# Patient Record
Sex: FEMALE | Race: WHITE | NOT HISPANIC OR LATINO | Employment: FULL TIME | URBAN - METROPOLITAN AREA
[De-identification: names, ages, dates, MRNs, and addresses within clinical notes are randomized per-mention and may not be internally consistent; named-entity substitution may affect disease eponyms.]

---

## 2017-02-07 ENCOUNTER — APPOINTMENT (OUTPATIENT)
Dept: GENERAL RADIOLOGY | Facility: CLINIC | Age: 42
DRG: 871 | End: 2017-02-07
Attending: EMERGENCY MEDICINE
Payer: COMMERCIAL

## 2017-02-07 ENCOUNTER — HOSPITAL ENCOUNTER (INPATIENT)
Facility: CLINIC | Age: 42
LOS: 2 days | Discharge: HOME | DRG: 871 | End: 2017-02-10
Attending: EMERGENCY MEDICINE | Admitting: FAMILY MEDICINE
Payer: COMMERCIAL

## 2017-02-07 DIAGNOSIS — I95.9 HYPOTENSION, UNSPECIFIED HYPOTENSION TYPE: ICD-10-CM

## 2017-02-07 DIAGNOSIS — J18.9 PNEUMONIA OF LEFT LOWER LOBE DUE TO INFECTIOUS ORGANISM: ICD-10-CM

## 2017-02-07 DIAGNOSIS — J10.1 INFLUENZA A: ICD-10-CM

## 2017-02-07 LAB
ALBUMIN SERPL-MCNC: 3.7 G/DL (ref 3.4–5)
ALBUMIN UR-MCNC: 30 MG/DL
ALP SERPL-CCNC: 54 U/L (ref 40–150)
ALT SERPL W P-5'-P-CCNC: 33 U/L (ref 0–50)
ANION GAP SERPL CALCULATED.3IONS-SCNC: 10 MMOL/L (ref 3–14)
APPEARANCE UR: CLEAR
AST SERPL W P-5'-P-CCNC: 32 U/L (ref 0–45)
BACTERIA #/AREA URNS HPF: ABNORMAL /HPF
BASOPHILS # BLD AUTO: 0 10E9/L (ref 0–0.2)
BASOPHILS NFR BLD AUTO: 0 %
BILIRUB SERPL-MCNC: 0.6 MG/DL (ref 0.2–1.3)
BILIRUB UR QL STRIP: NEGATIVE
BUN SERPL-MCNC: 12 MG/DL (ref 7–30)
CALCIUM SERPL-MCNC: 8.3 MG/DL (ref 8.5–10.1)
CHLORIDE SERPL-SCNC: 101 MMOL/L (ref 94–109)
CO2 SERPL-SCNC: 25 MMOL/L (ref 20–32)
COLOR UR AUTO: YELLOW
CREAT SERPL-MCNC: 0.85 MG/DL (ref 0.52–1.04)
DEPRECATED S PYO AG THROAT QL EIA: NORMAL
DIFFERENTIAL METHOD BLD: ABNORMAL
EOSINOPHIL # BLD AUTO: 0 10E9/L (ref 0–0.7)
EOSINOPHIL NFR BLD AUTO: 0 %
ERYTHROCYTE [DISTWIDTH] IN BLOOD BY AUTOMATED COUNT: 12.4 % (ref 10–15)
FLUAV+FLUBV AG SPEC QL: ABNORMAL
FLUAV+FLUBV AG SPEC QL: ABNORMAL
GFR SERPL CREATININE-BSD FRML MDRD: 73 ML/MIN/1.7M2
GLUCOSE SERPL-MCNC: 107 MG/DL (ref 70–99)
GLUCOSE UR STRIP-MCNC: NEGATIVE MG/DL
HCT VFR BLD AUTO: 41.1 % (ref 35–47)
HGB BLD-MCNC: 13.5 G/DL (ref 11.7–15.7)
HGB UR QL STRIP: ABNORMAL
IMM GRANULOCYTES # BLD: 0 10E9/L (ref 0–0.4)
IMM GRANULOCYTES NFR BLD: 0.4 %
KETONES UR STRIP-MCNC: 80 MG/DL
LACTATE BLD-SCNC: 0.7 MMOL/L (ref 0.7–2.1)
LACTATE BLD-SCNC: 0.9 MMOL/L (ref 0.7–2.1)
LEUKOCYTE ESTERASE UR QL STRIP: NEGATIVE
LIPASE SERPL-CCNC: 183 U/L (ref 73–393)
LYMPHOCYTES # BLD AUTO: 0.2 10E9/L (ref 0.8–5.3)
LYMPHOCYTES NFR BLD AUTO: 1.6 %
MCH RBC QN AUTO: 29.6 PG (ref 26.5–33)
MCHC RBC AUTO-ENTMCNC: 32.8 G/DL (ref 31.5–36.5)
MCV RBC AUTO: 90 FL (ref 78–100)
MICRO REPORT STATUS: NORMAL
MONOCYTES # BLD AUTO: 0.7 10E9/L (ref 0–1.3)
MONOCYTES NFR BLD AUTO: 6.6 %
MUCOUS THREADS #/AREA URNS LPF: PRESENT /LPF
NEUTROPHILS # BLD AUTO: 10 10E9/L (ref 1.6–8.3)
NEUTROPHILS NFR BLD AUTO: 91.4 %
NITRATE UR QL: NEGATIVE
PH UR STRIP: 6 PH (ref 5–7)
PLATELET # BLD AUTO: 171 10E9/L (ref 150–450)
POTASSIUM SERPL-SCNC: 3.5 MMOL/L (ref 3.4–5.3)
PROT SERPL-MCNC: 7.6 G/DL (ref 6.8–8.8)
RBC # BLD AUTO: 4.56 10E12/L (ref 3.8–5.2)
RBC #/AREA URNS AUTO: 4 /HPF (ref 0–2)
SODIUM SERPL-SCNC: 136 MMOL/L (ref 133–144)
SP GR UR STRIP: 1.02 (ref 1–1.03)
SPECIMEN SOURCE: ABNORMAL
SPECIMEN SOURCE: NORMAL
SQUAMOUS #/AREA URNS AUTO: 2 /HPF (ref 0–1)
URN SPEC COLLECT METH UR: ABNORMAL
UROBILINOGEN UR STRIP-MCNC: NORMAL MG/DL (ref 0–2)
WBC # BLD AUTO: 10.9 10E9/L (ref 4–11)
WBC #/AREA URNS AUTO: 1 /HPF (ref 0–2)

## 2017-02-07 PROCEDURE — 99207 ZZC CDG-CHARGE/DX NOT SELECTED BY PROVIDER: CPT | Performed by: FAMILY MEDICINE

## 2017-02-07 PROCEDURE — G0378 HOSPITAL OBSERVATION PER HR: HCPCS

## 2017-02-07 PROCEDURE — 87804 INFLUENZA ASSAY W/OPTIC: CPT | Performed by: EMERGENCY MEDICINE

## 2017-02-07 PROCEDURE — 25000128 H RX IP 250 OP 636: Performed by: EMERGENCY MEDICINE

## 2017-02-07 PROCEDURE — 25000128 H RX IP 250 OP 636: Performed by: FAMILY MEDICINE

## 2017-02-07 PROCEDURE — 96366 THER/PROPH/DIAG IV INF ADDON: CPT

## 2017-02-07 PROCEDURE — 96375 TX/PRO/DX INJ NEW DRUG ADDON: CPT

## 2017-02-07 PROCEDURE — 99218 ZZC INITIAL OBSERVATION CARE,LEVL I: CPT | Performed by: FAMILY MEDICINE

## 2017-02-07 PROCEDURE — 85025 COMPLETE CBC W/AUTO DIFF WBC: CPT | Performed by: FAMILY MEDICINE

## 2017-02-07 PROCEDURE — 96367 TX/PROPH/DG ADDL SEQ IV INF: CPT

## 2017-02-07 PROCEDURE — 87081 CULTURE SCREEN ONLY: CPT | Performed by: EMERGENCY MEDICINE

## 2017-02-07 PROCEDURE — 96365 THER/PROPH/DIAG IV INF INIT: CPT

## 2017-02-07 PROCEDURE — 71020 XR CHEST 2 VW: CPT

## 2017-02-07 PROCEDURE — 25000132 ZZH RX MED GY IP 250 OP 250 PS 637: Performed by: EMERGENCY MEDICINE

## 2017-02-07 PROCEDURE — 99285 EMERGENCY DEPT VISIT HI MDM: CPT | Mod: 25

## 2017-02-07 PROCEDURE — 83690 ASSAY OF LIPASE: CPT | Performed by: FAMILY MEDICINE

## 2017-02-07 PROCEDURE — 80053 COMPREHEN METABOLIC PANEL: CPT | Performed by: FAMILY MEDICINE

## 2017-02-07 PROCEDURE — 99291 CRITICAL CARE FIRST HOUR: CPT | Performed by: EMERGENCY MEDICINE

## 2017-02-07 PROCEDURE — 87880 STREP A ASSAY W/OPTIC: CPT | Performed by: EMERGENCY MEDICINE

## 2017-02-07 PROCEDURE — 81001 URINALYSIS AUTO W/SCOPE: CPT | Performed by: EMERGENCY MEDICINE

## 2017-02-07 PROCEDURE — 83605 ASSAY OF LACTIC ACID: CPT | Performed by: FAMILY MEDICINE

## 2017-02-07 PROCEDURE — 96361 HYDRATE IV INFUSION ADD-ON: CPT

## 2017-02-07 PROCEDURE — 83605 ASSAY OF LACTIC ACID: CPT | Performed by: EMERGENCY MEDICINE

## 2017-02-07 RX ORDER — CEFTRIAXONE 1 G/1
1 INJECTION, POWDER, FOR SOLUTION INTRAMUSCULAR; INTRAVENOUS EVERY 24 HOURS
Status: DISCONTINUED | OUTPATIENT
Start: 2017-02-08 | End: 2017-02-10 | Stop reason: HOSPADM

## 2017-02-07 RX ORDER — ONDANSETRON 4 MG/1
4 TABLET, ORALLY DISINTEGRATING ORAL EVERY 6 HOURS PRN
Status: DISCONTINUED | OUTPATIENT
Start: 2017-02-07 | End: 2017-02-10 | Stop reason: HOSPADM

## 2017-02-07 RX ORDER — ONDANSETRON 2 MG/ML
4 INJECTION INTRAMUSCULAR; INTRAVENOUS EVERY 6 HOURS PRN
Status: DISCONTINUED | OUTPATIENT
Start: 2017-02-07 | End: 2017-02-07

## 2017-02-07 RX ORDER — CEFTRIAXONE 1 G/1
1 INJECTION, POWDER, FOR SOLUTION INTRAMUSCULAR; INTRAVENOUS ONCE
Status: COMPLETED | OUTPATIENT
Start: 2017-02-07 | End: 2017-02-07

## 2017-02-07 RX ORDER — NALOXONE HYDROCHLORIDE 0.4 MG/ML
.1-.4 INJECTION, SOLUTION INTRAMUSCULAR; INTRAVENOUS; SUBCUTANEOUS
Status: DISCONTINUED | OUTPATIENT
Start: 2017-02-07 | End: 2017-02-10 | Stop reason: HOSPADM

## 2017-02-07 RX ORDER — ACETAMINOPHEN 325 MG/1
650 TABLET ORAL EVERY 4 HOURS PRN
Status: DISCONTINUED | OUTPATIENT
Start: 2017-02-07 | End: 2017-02-07

## 2017-02-07 RX ORDER — SODIUM CHLORIDE 9 MG/ML
1000 INJECTION, SOLUTION INTRAVENOUS CONTINUOUS
Status: DISCONTINUED | OUTPATIENT
Start: 2017-02-07 | End: 2017-02-07

## 2017-02-07 RX ORDER — ONDANSETRON 2 MG/ML
4 INJECTION INTRAMUSCULAR; INTRAVENOUS ONCE
Status: COMPLETED | OUTPATIENT
Start: 2017-02-07 | End: 2017-02-07

## 2017-02-07 RX ORDER — SODIUM CHLORIDE 9 MG/ML
INJECTION, SOLUTION INTRAVENOUS CONTINUOUS
Status: DISCONTINUED | OUTPATIENT
Start: 2017-02-07 | End: 2017-02-10 | Stop reason: HOSPADM

## 2017-02-07 RX ORDER — ONDANSETRON 2 MG/ML
4 INJECTION INTRAMUSCULAR; INTRAVENOUS EVERY 6 HOURS PRN
Status: DISCONTINUED | OUTPATIENT
Start: 2017-02-07 | End: 2017-02-10 | Stop reason: HOSPADM

## 2017-02-07 RX ORDER — IBUPROFEN 600 MG/1
600 TABLET, FILM COATED ORAL EVERY 6 HOURS PRN
Status: DISCONTINUED | OUTPATIENT
Start: 2017-02-07 | End: 2017-02-10 | Stop reason: HOSPADM

## 2017-02-07 RX ORDER — NALOXONE HYDROCHLORIDE 0.4 MG/ML
.1-.4 INJECTION, SOLUTION INTRAMUSCULAR; INTRAVENOUS; SUBCUTANEOUS
Status: DISCONTINUED | OUTPATIENT
Start: 2017-02-07 | End: 2017-02-07

## 2017-02-07 RX ORDER — HYDROCODONE BITARTRATE AND ACETAMINOPHEN 5; 325 MG/1; MG/1
1-2 TABLET ORAL EVERY 4 HOURS PRN
Status: DISCONTINUED | OUTPATIENT
Start: 2017-02-07 | End: 2017-02-10 | Stop reason: HOSPADM

## 2017-02-07 RX ORDER — PROCHLORPERAZINE MALEATE 5 MG
5-10 TABLET ORAL EVERY 6 HOURS PRN
Status: DISCONTINUED | OUTPATIENT
Start: 2017-02-07 | End: 2017-02-10 | Stop reason: HOSPADM

## 2017-02-07 RX ORDER — ACETAMINOPHEN 500 MG
1000 TABLET ORAL ONCE
Status: COMPLETED | OUTPATIENT
Start: 2017-02-07 | End: 2017-02-07

## 2017-02-07 RX ORDER — POLYETHYLENE GLYCOL 3350 17 G/17G
17 POWDER, FOR SOLUTION ORAL DAILY PRN
Status: DISCONTINUED | OUTPATIENT
Start: 2017-02-07 | End: 2017-02-10 | Stop reason: HOSPADM

## 2017-02-07 RX ORDER — ACETAMINOPHEN 325 MG/1
650 TABLET ORAL EVERY 4 HOURS PRN
Status: DISCONTINUED | OUTPATIENT
Start: 2017-02-07 | End: 2017-02-10 | Stop reason: HOSPADM

## 2017-02-07 RX ORDER — KETOROLAC TROMETHAMINE 30 MG/ML
30 INJECTION, SOLUTION INTRAMUSCULAR; INTRAVENOUS ONCE
Status: COMPLETED | OUTPATIENT
Start: 2017-02-07 | End: 2017-02-07

## 2017-02-07 RX ORDER — OSELTAMIVIR PHOSPHATE 75 MG/1
75 CAPSULE ORAL 2 TIMES DAILY
Status: DISCONTINUED | OUTPATIENT
Start: 2017-02-07 | End: 2017-02-10 | Stop reason: HOSPADM

## 2017-02-07 RX ORDER — LIDOCAINE 40 MG/G
CREAM TOPICAL
Status: DISCONTINUED | OUTPATIENT
Start: 2017-02-07 | End: 2017-02-10 | Stop reason: HOSPADM

## 2017-02-07 RX ORDER — AZITHROMYCIN 250 MG/1
500 TABLET, FILM COATED ORAL DAILY
Status: COMPLETED | OUTPATIENT
Start: 2017-02-08 | End: 2017-02-10

## 2017-02-07 RX ORDER — PROCHLORPERAZINE 25 MG
25 SUPPOSITORY, RECTAL RECTAL EVERY 12 HOURS PRN
Status: DISCONTINUED | OUTPATIENT
Start: 2017-02-07 | End: 2017-02-10 | Stop reason: HOSPADM

## 2017-02-07 RX ORDER — ONDANSETRON 4 MG/1
4 TABLET, ORALLY DISINTEGRATING ORAL EVERY 6 HOURS PRN
Status: DISCONTINUED | OUTPATIENT
Start: 2017-02-07 | End: 2017-02-07

## 2017-02-07 RX ORDER — SODIUM CHLORIDE 9 MG/ML
INJECTION, SOLUTION INTRAVENOUS CONTINUOUS
Status: DISCONTINUED | OUTPATIENT
Start: 2017-02-07 | End: 2017-02-07

## 2017-02-07 RX ADMIN — SODIUM CHLORIDE 1000 ML: 9 INJECTION, SOLUTION INTRAVENOUS at 14:53

## 2017-02-07 RX ADMIN — CEFTRIAXONE 1 G: 1 INJECTION, POWDER, FOR SOLUTION INTRAMUSCULAR; INTRAVENOUS at 15:01

## 2017-02-07 RX ADMIN — SODIUM CHLORIDE 1000 ML: 9 INJECTION, SOLUTION INTRAVENOUS at 16:58

## 2017-02-07 RX ADMIN — ACETAMINOPHEN 1000 MG: 500 TABLET ORAL at 13:14

## 2017-02-07 RX ADMIN — OSELTAMIVIR PHOSPHATE 75 MG: 75 CAPSULE ORAL at 21:31

## 2017-02-07 RX ADMIN — SODIUM CHLORIDE: 9 INJECTION, SOLUTION INTRAVENOUS at 22:20

## 2017-02-07 RX ADMIN — IBUPROFEN 600 MG: 600 TABLET ORAL at 22:25

## 2017-02-07 RX ADMIN — AZITHROMYCIN MONOHYDRATE 500 MG: 500 INJECTION, POWDER, LYOPHILIZED, FOR SOLUTION INTRAVENOUS at 20:19

## 2017-02-07 RX ADMIN — ONDANSETRON 4 MG: 2 INJECTION INTRAMUSCULAR; INTRAVENOUS at 13:15

## 2017-02-07 RX ADMIN — KETOROLAC TROMETHAMINE 30 MG: 30 INJECTION, SOLUTION INTRAMUSCULAR at 14:09

## 2017-02-07 RX ADMIN — SODIUM CHLORIDE 1000 ML: 9 INJECTION, SOLUTION INTRAVENOUS at 13:08

## 2017-02-07 ASSESSMENT — ENCOUNTER SYMPTOMS
ACTIVITY CHANGE: 1
BACK PAIN: 0
MYALGIAS: 1
ABDOMINAL PAIN: 0
SORE THROAT: 0
NAUSEA: 1
WHEEZING: 1
COUGH: 1
CONFUSION: 0
FATIGUE: 1
FEVER: 1
VOMITING: 1
LIGHT-HEADEDNESS: 0
CHILLS: 1
DIAPHORESIS: 1
NECK STIFFNESS: 0
WEAKNESS: 0
TROUBLE SWALLOWING: 0
DIZZINESS: 0
APPETITE CHANGE: 1
BRUISES/BLEEDS EASILY: 0
NECK PAIN: 0
SHORTNESS OF BREATH: 1
DYSURIA: 0
DIARRHEA: 0
STRIDOR: 0

## 2017-02-07 NOTE — LETTER
[facility] SURGICAL  [address] [address]  [phone]  Dept: [phone]      2/10/2017    Re: Leila Azul      TO WHOM IT MAY CONCERN:    Leila Azul  was seen on 2/10/2017 .  Please excuse her  until 2/17/17 due to illness.    Cordially    Marcel Nieves MD   [facility]

## 2017-02-07 NOTE — ED NOTES
Pt up to [facility] w/ assist. Pt denied dizziness with transfer. Urine dark amaya in color. 4th liter NS started

## 2017-02-07 NOTE — IP AVS SNAPSHOT
[facility]    [address] [address]    Phone:  [phone]    Fax:  [phone]                                       After Visit Summary   2/7/2017    Leila Azul    MRN: [id number]           After Visit Summary Signature Page     I have received my discharge instructions, and my questions have been answered. I have discussed any challenges I see with this plan with the nurse or doctor.    ..........................................................................................................................................  Patient/Patient Representative Signature      ..........................................................................................................................................  Patient Representative Print Name and Relationship to Patient    ..................................................               ................................................  Date                                            Time    ..........................................................................................................................................  Reviewed by Signature/Title    ...................................................              ..............................................  Date                                                            Time

## 2017-02-07 NOTE — IP AVS SNAPSHOT
MRN:[id number]                      After Visit Summary   2/7/2017    Leila Azul    MRN: [id number]           Thank you!     Thank you for choosing [facility] for your care. Our goal is always to provide you with excellent care. Hearing back from our patients is one way we can continue to improve our services. Please take a few minutes to complete the written survey that you may receive in the mail after you visit with us. Thank you!        Patient Information     Date Of Birth          1975        About your hospital stay     You were admitted on:  February 7, 2017 You last received care in the:  [facility]    You were discharged on:  February 10, 2017       Who to Call     For medical emergencies, please call 911.  For non-urgent questions about your medical care, please call your primary care provider or clinic, [phone]          Attending Provider     Provider    Garcia Mendoza MD Eikens, MD Lizbeth Farris, Marcel HERNANDEZ MD       Primary Care Provider Office Phone # Fax #    Lili Tyson [phone]       [facility] [address] [facility] [address]        Your next 10 appointments already scheduled     Feb 17, 2017 11:20 AM   SHORT with Lili Tyson MD   [facility]    [address] [address] [id number] [phone]              Further instructions from your care team       omnicef 300 mg 2 times/day for 7 more days  Robitussin AC 1-2 tsp every 4 hrs as needed for cough  Off work 7 days  recheck in clinic in the next week  Recheck chest x-ray in 6 weeks to ensure clearing.     Pending Results     Date and Time Order Name Status Description    2/10/2017 0306 EKG 12-LEAD, TRACING ONLY In process     2/9/2017 1206 EKG 12-lead, tracing only In process     2/9/2017 0800 EKG 12-lead, radiology (Hospital only) In process             Statement of  "Approval     Ordered          02/10/17 0808  I have reviewed and agree with all the recommendations and orders detailed in this document.   EFFECTIVE NOW     Approved and electronically signed by:  Marcel Nieves MD             Admission Information        Provider Department Dept Phone    2017 Marcel Nieves MD [facility] [phone]      Your Vitals Were     Blood Pressure Pulse Temperature    112/65 mmHg 66 97  F (36.1  C) (Oral)    Respirations Height Weight    16 1.575 m (5' 2\") 62.1 kg (136 lb 14.5 oz)    BMI (Body Mass Index) Pulse Oximetry       25.03 kg/m2 97%       MyChart Information     [employer] gives you secure access to your electronic health record. If you see a primary care provider, you can also send messages to your care team and make appointments. If you have questions, please call your primary care clinic.  If you do not have a primary care provider, please call [phone] and they will assist you.        Care EveryWhere ID     This is your Care EveryWhere ID. This could be used by other organizations to access your [facility] medical records  [id number]           Review of your medicines      START taking        Dose / Directions    cefdinir 300 MG capsule   Commonly known as:  OMNICEF   Used for:  Pneumonia of left lower lobe due to infectious organism        Dose:  300 mg   Take 1 capsule (300 mg) by mouth 2 times daily   Quantity:  14 capsule   Refills:  0       guaiFENesin-codeine 100-10 MG/5ML Soln solution   Commonly known as:  ROBITUSSIN AC   Used for:  Pneumonia of left lower lobe due to infectious organism        Dose:  1-2 tsp.   Take 5-10 mLs by mouth every 4 hours as needed for cough   Quantity:  120 mL   Refills:  1         CONTINUE these medicines which have NOT CHANGED        Dose / Directions    ibuprofen 400 MG tablet   Commonly known as:  ADVIL/MOTRIN   Used for:   (normal spontaneous vaginal delivery)        Dose:  400-800 mg   Take 1-2 tablets " (400-800 mg) by mouth every 6 hours as needed for other (cramping)   Quantity:  120 tablet   Refills:  0            Where to get your medicines      These medications were sent to [facility] - [place], [place] - [address]  [address] [address]     Phone:  [phone]    - cefdinir 300 MG capsule      Some of these will need a paper prescription and others can be bought over the counter. Ask your nurse if you have questions.     Bring a paper prescription for each of these medications    - guaiFENesin-codeine 100-10 MG/5ML Soln solution             Protect others around you: Learn how to safely use, store and throw away your medicines at www.disposemymeds.org.             Medication List: This is a list of all your medications and when to take them. Check marks below indicate your daily home schedule. Keep this list as a reference.      Medications           Morning Afternoon Evening Bedtime As Needed    cefdinir 300 MG capsule   Commonly known as:  OMNICEF   Take 1 capsule (300 mg) by mouth 2 times daily                       May start today 2/10/17               guaiFENesin-codeine 100-10 MG/5ML Soln solution   Commonly known as:  ROBITUSSIN AC   Take 5-10 mLs by mouth every 4 hours as needed for cough                            Take every 4 hours as needed- cough suppressant        ibuprofen 400 MG tablet   Commonly known as:  ADVIL/MOTRIN   Take 1-2 tablets (400-800 mg) by mouth every 6 hours as needed for other (cramping)   Last time this was given:  600 mg on 2/10/2017  2:49 AM                            Take as needed- may have again anytime after 9 am- 2/1017                 More Information        Patient Education    Cefdinir Oral capsule    Cefdinir Oral suspension  Cefdinir Oral capsule  What is this medicine?  CEFDINIR (SEF di ner) is a cephalosporin antibiotic. It is used to treat certain kinds of bacterial infections. It will not work for colds, flu, or other viral  infections.  This medicine may be used for other purposes; ask your health care provider or pharmacist if you have questions.  What should I tell my health care provider before I take this medicine?  They need to know if you have any of these conditions:    bleeding problems    kidney disease    stomach or intestine problems (especially colitis)    an unusual or allergic reaction to cefdinir, other cephalosporin antibiotics, penicillin, penicillamine, other foods, dyes or preservatives    pregnant or trying to get pregnant    breast-feeding  How should I use this medicine?  Take this medicine by mouth. Swallow it with a drink of water. Follow the directions on the prescription label. You can take it with or without food. If it upsets your stomach it may help to take it with food. Take your doses at regular intervals. Do not take it more often than directed. Finish all the medicine you are prescribed even if you think your infection is better.  Talk to your pediatrician regarding the use of this medicine in children. Special care may be needed.  Overdosage: If you think you have taken too much of this medicine contact a poison control center or emergency room at once.  NOTE: This medicine is only for you. Do not share this medicine with others.  What if I miss a dose?  If you miss a dose, take it as soon as you can. If it is almost time for your next dose, take only that dose. Do not take double or extra doses.  What may interact with this medicine?    antacids that contain aluminum or magnesium    iron supplements    other antibiotics    probenecid  This list may not describe all possible interactions. Give your health care provider a list of all the medicines, herbs, non-prescription drugs, or dietary supplements you use. Also tell them if you smoke, drink alcohol, or use illegal drugs. Some items may interact with your medicine.  What should I watch for while using this medicine?  Tell your doctor or health care  professional if your symptoms do not get better in a few days.  If you are diabetic you may get a false-positive result for sugar in your urine. Check with your doctor or health care professional before you change your diet or the dose of your diabetes medicine.  What side effects may I notice from receiving this medicine?  Side effects that you should report to your doctor or health care professional as soon as possible:    allergic reactions like skin rash, itching or hives, swelling of the face, lips, or tongue    breathing problems    fever or chills    redness, blistering, peeling or loosening of the skin, including inside the mouth    seizures    severe or watery diarrhea    sore throat    swollen joints    trouble passing urine or change in the amount of urine    unusual bleeding or bruising    unusually weak or tired  Side effects that usually do not require medical attention (report to your doctor or health care professional if they continue or are bothersome):    constipation    dizziness    gas or heartburn    headache    loss of appetite    nausea or vomiting    stomach pain    stool discoloration    vaginal itching  This list may not describe all possible side effects. Call your doctor for medical advice about side effects. You may report side effects to FDA at 0-614-FDA-3170.  Where should I keep my medicine?  Keep out of the reach of children.  Store at room temperature between 15 and 30 degrees C (59 and 86 degrees F). Throw the medicine away after the expiration date.  NOTE:This sheet is a summary. It may not cover all possible information. If you have questions about this medicine, talk to your doctor, pharmacist, or health care provider. Copyright  2016 Gold Standard

## 2017-02-07 NOTE — ED PROVIDER NOTES
History     Chief Complaint   Patient presents with     Flu Symptoms     did not get a flu shot     HPI  Leila Azul is a 41 year old female who presents with flu symptoms. The patient developed a cough two days ago with associated chest pain and shortness of breath. She vomits due to coughing. The patient denies sore throat and diarrhea. Her fluid intake has decreased. The patient's daughter recently had the flu and her son has the flu. Her family member states she took Ibuprofen this morning, but is unsure how much she took. She also states the patient didn't know what time it was when being brought to the ED. she currently rates her pain as 6 out of 10 describing it as generalized body aches.    Past Medical History   Diagnosis Date     Chickenpox      Anemia       increased blood loss after 1st delivery      labor ,,     -bedrest/terbutaline wiith 1st two preg.       Past Surgical History   Procedure Laterality Date     Surgical history of -        eye surgery age 2       Social History   Substance Use Topics     Smoking status: Never Smoker      Smokeless tobacco: Never Used     Alcohol Use: No      Comment: rarely/quit with pregnancy        Allergies   Allergen Reactions     Amoxicillin Rash     Trimethoprim Sulfate      Vomiting blood after taking medication.     I have reviewed the Medications, Allergies, Past Medical and Surgical History, and Social History in the Epic system.    Review of Systems   Constitutional: Positive for fever, chills, diaphoresis, activity change, appetite change and fatigue.   HENT: Positive for congestion. Negative for sore throat and trouble swallowing.    Respiratory: Positive for cough, shortness of breath and wheezing. Negative for stridor.    Cardiovascular: Positive for chest pain.   Gastrointestinal: Positive for nausea and vomiting. Negative for abdominal pain and diarrhea.   Genitourinary: Negative for dysuria and decreased urine volume.    Musculoskeletal: Positive for myalgias. Negative for back pain, neck pain and neck stiffness.   Neurological: Negative for dizziness, weakness and light-headedness.   Hematological: Does not bruise/bleed easily.   Psychiatric/Behavioral: Negative for confusion.     All other systems were reviewed and are negative.     Physical Exam   BP: 92/54 mmHg  Heart Rate: 127  Temp: 103  F (39.4  C)  Resp: (!) 32  SpO2: 95 %  Physical Exam   Constitutional: She appears well-developed and well-nourished. No distress.   Psychiatric: She has a normal mood and affect.   Nursing note and vitals reviewed.       HENT: Oral mucosa moist, no lesions. Posterior pharynx mild significant erythema or edema. TM's clear bilaterally.   Neck: Neck supple  Cardiovascular: Heart tachycardic, no murmur.   Pulmonary/Chest: Lungs slightly decreased at bases.   Abdominal: Abdomen is soft, non-distended, and non-tender.   Musculoskeletal: Moving all extremities well. No peripheral edema.   Neurological: Alert.  No focal deficit  Skin: No rash.     ED Course   Procedures             Critical Care time:  was 30 minutes for this patient excluding procedures.               Labs Ordered and Resulted from Time of ED Arrival Up to the Time of Departure from the ED   COMPREHENSIVE METABOLIC PANEL - Abnormal; Notable for the following:     Glucose 107 (*)     Calcium 8.3 (*)     All other components within normal limits   CBC WITH PLATELETS DIFFERENTIAL - Abnormal; Notable for the following:     Absolute Neutrophil 10.0 (*)     Absolute Lymphocytes 0.2 (*)     All other components within normal limits   URINE MACROSCOPIC WITH REFLEX TO MICRO - Abnormal; Notable for the following:     Ketones Urine 80 (*)     Blood Urine Small (*)     Protein Albumin Urine 30 (*)     RBC Urine 4 (*)     Bacteria Urine Few (*)     Squamous Epithelial /HPF Urine 2 (*)     Mucous Urine Present (*)     All other components within normal limits   INFLUENZA A/B ANTIGEN - Abnormal;  Notable for the following:     Influenza A   (*)     Value: Positive   Test results must be correlated with clinical data. If necessary, results   should be confirmed by a molecular assay or viral culture.      All other components within normal limits   LACTIC ACID WHOLE BLOOD   LIPASE   LACTIC ACID WHOLE BLOOD   PERIPHERAL IV CATHETER   PULSE OXIMETRY NURSING   CARDIAC CONTINUOUS MONITORING   NURSING DRAW AND HOLD   NURSING DRAW AND HOLD   NURSING DRAW AND HOLD   ORTHOSTATIC BLOOD PRESSURE AND PULSE   RAPID STREP SCREEN   BETA STREP GROUP A CULTURE       Results for orders placed or performed during the hospital encounter of 02/07/17   Chest XR,  PA & LAT    Narrative    XR CHEST 2 VW 2/7/2017 1:58 PM    HISTORY: Cough. Short of breath.    COMPARISON: None.      Impression    IMPRESSION: 2 views of the chest show small amount of vague opacity at  the left lung base on the frontal view which is not well seen on the  lateral view. This could represent early pneumonia in the appropriate  clinical setting. Heart size and mediastinal contours are normal.     BERNADETTE TAVAREZ MD       Medications   lidocaine 1 % 1 mL (not administered)   lidocaine (LMX4) kit (not administered)   sodium chloride (PF) 0.9% PF flush 3 mL (not administered)   sodium chloride (PF) 0.9% PF flush 3 mL ( Intravenous Canceled Entry 2/7/17 2154)   oseltamivir (TAMIFLU) capsule 75 mg (75 mg Oral Given 2/7/17 2131)   ibuprofen (ADVIL/MOTRIN) tablet 600 mg (600 mg Oral Given 2/7/17 2225)   HYDROcodone-acetaminophen (NORCO) 5-325 MG per tablet 1-2 tablet (not administered)   cefTRIAXone (ROCEPHIN) 1 g vial to attach to  mL bag for ADULTS or NS 50 mL bag for PEDS (not administered)   naloxone (NARCAN) injection 0.1-0.4 mg (not administered)   0.9% sodium chloride infusion ( Intravenous New Bag 2/7/17 2220)   acetaminophen (TYLENOL) tablet 650 mg (not administered)   polyethylene glycol (MIRALAX/GLYCOLAX) Packet 17 g (not administered)    prochlorperazine (COMPAZINE) injection 5-10 mg (not administered)     Or   prochlorperazine (COMPAZINE) tablet 5-10 mg (not administered)     Or   prochlorperazine (COMPAZINE) Suppository 25 mg (not administered)   ondansetron (ZOFRAN-ODT) ODT tab 4 mg (not administered)     Or   ondansetron (ZOFRAN) injection 4 mg (not administered)   azithromycin (ZITHROMAX) tablet 500 mg (not administered)   0.9% sodium chloride BOLUS (0 mLs Intravenous Stopped 2/7/17 1653)   0.9% sodium chloride BOLUS (0 mLs Intravenous Stopped 2/7/17 1449)   ondansetron (ZOFRAN) injection 4 mg (4 mg Intravenous Given 2/7/17 1315)   acetaminophen (TYLENOL) tablet 1,000 mg (1,000 mg Oral Given 2/7/17 1314)   ketorolac (TORADOL) injection 30 mg (30 mg Intravenous Given 2/7/17 1409)   cefTRIAXone (ROCEPHIN) 1 g vial to attach to  mL bag for ADULTS or NS 50 mL bag for PEDS (0 g Intravenous Stopped 2/7/17 1653)   azithromycin (ZITHROMAX) 500 mg in NaCl 0.9 % 250 mL intermittent infusion (0 mg Intravenous Stopped 2/7/17 2130)     12:46 PM patient assessed.     Assessments & Plan (with Medical Decision Making) patient was given IV fluid bolus due to her tachycardia.  comprehensive metabolic panel significant for glucose 107.  Patient's white count was not elevated.  There was a slight left shift.  Urinalysis with 80 ketones and small blood.  Lactic acid was within normal limits.  Influenza was positive.  Chest x-ray revealed a possible vague opacity at the left lung base which could be consistent with an early pneumonia.  Patient was covered with ceftriaxone IV.  Patient is pulse rate came down into the 80s and 90s but Patient's blood pressure remained in the upper 80s to 90s and fluid boluses continued. I did give the patient a 4 L of IV fluids and her pressure remained in the mid upper 80s.  She appears in no distress and orthostatics are not significantly variable.  At this time I do not feel comfortable sending the patient home with this  low blood pressure.  I feel observation would be warranted  I'm going to cover her with Zithromax per possible community-acquired pneumonia .  I discussed the case with Dr. Nieves who is the hospitalist on-call he was in agreement with admission.       I have reviewed the nursing notes.    I have reviewed the findings, diagnosis, plan and need for follow up with the patient.    Current Discharge Medication List          Final diagnoses:   Influenza A   Pneumonia of left lower lobe due to infectious organism   Hypotension, unspecified hypotension type     This document serves as a record of the services and decisions personally performed and made by Garcia Mendoza MD. It was created on HIS/HER behalf by Christie Dowell, a trained medical scribe. The creation of this document is based the provider's statements to the medical scribe.  Christie Dowell 12:46 PM 2/7/2017    Provider:   The information in this document, created by the medical scribe for me, accurately reflects the services I personally performed and the decisions made by me. I have reviewed and approved this document for accuracy prior to leaving the patient care area.  Garcia Mendoza MD 12:46 PM 2/7/2017 2/7/2017   [facility] EMERGENCY DEPARTMENT      Garcia Mendoza MD  02/07/17 2306    Garcia Mendoza MD  02/07/17 1432

## 2017-02-08 ENCOUNTER — APPOINTMENT (OUTPATIENT)
Dept: GENERAL RADIOLOGY | Facility: CLINIC | Age: 42
DRG: 871 | End: 2017-02-08
Attending: FAMILY MEDICINE
Payer: COMMERCIAL

## 2017-02-08 LAB
ANION GAP SERPL CALCULATED.3IONS-SCNC: 8 MMOL/L (ref 3–14)
BUN SERPL-MCNC: 12 MG/DL (ref 7–30)
CALCIUM SERPL-MCNC: 6.7 MG/DL (ref 8.5–10.1)
CHLORIDE SERPL-SCNC: 113 MMOL/L (ref 94–109)
CO2 SERPL-SCNC: 23 MMOL/L (ref 20–32)
CREAT SERPL-MCNC: 0.63 MG/DL (ref 0.52–1.04)
ERYTHROCYTE [DISTWIDTH] IN BLOOD BY AUTOMATED COUNT: 13.1 % (ref 10–15)
GFR SERPL CREATININE-BSD FRML MDRD: ABNORMAL ML/MIN/1.7M2
GLUCOSE SERPL-MCNC: 74 MG/DL (ref 70–99)
HCT VFR BLD AUTO: 33.8 % (ref 35–47)
HGB BLD-MCNC: 10.7 G/DL (ref 11.7–15.7)
MCH RBC QN AUTO: 29.6 PG (ref 26.5–33)
MCHC RBC AUTO-ENTMCNC: 31.7 G/DL (ref 31.5–36.5)
MCV RBC AUTO: 93 FL (ref 78–100)
PLATELET # BLD AUTO: 138 10E9/L (ref 150–450)
POTASSIUM SERPL-SCNC: 4 MMOL/L (ref 3.4–5.3)
RBC # BLD AUTO: 3.62 10E12/L (ref 3.8–5.2)
SODIUM SERPL-SCNC: 144 MMOL/L (ref 133–144)
WBC # BLD AUTO: 8.5 10E9/L (ref 4–11)

## 2017-02-08 PROCEDURE — 12000000 ZZH R&B MED SURG/OB

## 2017-02-08 PROCEDURE — 36415 COLL VENOUS BLD VENIPUNCTURE: CPT | Performed by: FAMILY MEDICINE

## 2017-02-08 PROCEDURE — 99233 SBSQ HOSP IP/OBS HIGH 50: CPT | Performed by: FAMILY MEDICINE

## 2017-02-08 PROCEDURE — G0378 HOSPITAL OBSERVATION PER HR: HCPCS

## 2017-02-08 PROCEDURE — 25000128 H RX IP 250 OP 636: Performed by: FAMILY MEDICINE

## 2017-02-08 PROCEDURE — 25000132 ZZH RX MED GY IP 250 OP 250 PS 637: Performed by: EMERGENCY MEDICINE

## 2017-02-08 PROCEDURE — 71020 XR CHEST 2 VW: CPT

## 2017-02-08 PROCEDURE — 85027 COMPLETE CBC AUTOMATED: CPT | Performed by: FAMILY MEDICINE

## 2017-02-08 PROCEDURE — 80048 BASIC METABOLIC PNL TOTAL CA: CPT | Performed by: FAMILY MEDICINE

## 2017-02-08 PROCEDURE — 25000132 ZZH RX MED GY IP 250 OP 250 PS 637: Performed by: FAMILY MEDICINE

## 2017-02-08 RX ADMIN — OSELTAMIVIR PHOSPHATE 75 MG: 75 CAPSULE ORAL at 18:57

## 2017-02-08 RX ADMIN — AZITHROMYCIN 500 MG: 250 TABLET, FILM COATED ORAL at 18:58

## 2017-02-08 RX ADMIN — SODIUM CHLORIDE: 9 INJECTION, SOLUTION INTRAVENOUS at 20:32

## 2017-02-08 RX ADMIN — CEFTRIAXONE 1 G: 1 INJECTION, POWDER, FOR SOLUTION INTRAMUSCULAR; INTRAVENOUS at 15:44

## 2017-02-08 RX ADMIN — HYDROCODONE BITARTRATE AND ACETAMINOPHEN 1 TABLET: 5; 325 TABLET ORAL at 11:30

## 2017-02-08 RX ADMIN — ACETAMINOPHEN 650 MG: 325 TABLET, FILM COATED ORAL at 18:57

## 2017-02-08 RX ADMIN — OSELTAMIVIR PHOSPHATE 75 MG: 75 CAPSULE ORAL at 07:53

## 2017-02-08 RX ADMIN — SODIUM CHLORIDE: 9 INJECTION, SOLUTION INTRAVENOUS at 04:26

## 2017-02-08 RX ADMIN — IBUPROFEN 600 MG: 600 TABLET ORAL at 10:24

## 2017-02-08 NOTE — PLAN OF CARE
Problem: Goal Outcome Summary  Goal: Goal Outcome Summary  Outcome: No Change  Patient A&O x3, up with stand by assist due to previous syncopal episode. Denying any pain, but reports feeling generalized achiness, so PRN ibuprofen administered. Diminished lung sounds with coarse crackles in LLL. Productive cough, but patient swallows sputum, so she is unsure of the color. Reports that it feels thick. Denying any nausea. Blood pressures have been soft, however they were 105/66 upon arrival to her room. NS infusing at 125/hour.

## 2017-02-08 NOTE — PROGRESS NOTES
"[facility] ADMISSION NOTE    Patient admitted to room 2213 at approximately 2140 via cart from emergency room. Patient was accompanied by transport tech.     Verbal SBAR report received from DALIA Lee prior to patient arrival.     Patient ambulated to bed with stand-by assist. Patient alert and oriented X 3. The patient is not having any pain.  . Admission vital signs: Blood pressure 105/66, temperature 98.2  F (36.8  C), temperature source Oral, resp. rate 16, height 1.575 m (5' 2\"), weight 62.1 kg (136 lb 14.5 oz), SpO2 96 %, not currently breastfeeding. Patient was oriented to plan of care, call light, bed controls, tv, telephone, bathroom and visiting hours.     The following safety risks were identified during admission: fall. Yellow risk band applied: YES.     Adriane Cantrell RN      "

## 2017-02-08 NOTE — H&P
CHIEF COMPLAINT:  Fever, weakness, lightheadedness.      HISTORY OF PRESENT ILLNESS:  Leila Azul comes in with a slightly less than 48-hour history of fairly sudden onset of symptoms including burning across the chest, followed by fevers, chills and cough all coming on in fairly rapid succession.  She started developing some nasal congestion and coryza over the next 12 hours.  Fevers have been persistent.  She has not eaten much of any food, but has been keeping up with fluids, but noticed she has not urinated much.  She started getting lightheaded today and finally came into the ED.      On admission, she was noted to be febrile to 103.0, with a blood pressure of 82/50.  She was given 4 liters of IV fluids in the ED, with blood pressure still in the 80s, 87/61, but marked improvement in her general subjective status.      PAST MEDICAL HISTORY:  Essentially unremarkable.  She has never been hospitalized and is on no medications.      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  She has four children at home, and a .  Nonsmoker.  No alcohol.      REVIEW OF SYSTEMS:  Ten-point review of systems was negative other than the above.      MEDICATIONS:  None regularly other than ibuprofen occasionally.      ALLERGIES:   1.  Amoxicillin caused a rash.   2.  Trimethoprim caused vomiting.   3.  Doxycycline caused a rash.      OBJECTIVE:   GENERAL:  She is awake, alert, oriented x3 at this point, in no apparent distress.   VITAL SIGNS:  Her last blood pressure was finally 106/66.  Pulse is down from 127 when she first arrived to 89 at this time.   HEENT:  Eyes show pupils equal and reactive, EOMs intact.  TMs normal.  Nasal mucosa is normal.  Throat is red.   NECK:  Supple, without masses, nodes or thyromegaly.   CHEST:  Hint of some rhonchi at the left base, no wheezes or rales.   CARDIOVASCULAR:  Regular rate and rhythm without murmur, click or rub.  Pulses are brisk and equal.  No  JVD or HJR.  No edema.   ABDOMEN:  Soft, nontender, without HSM or masses.   GENITOURINARY AND RECTAL:  Deferred.   EXTREMITIES:  Grossly normal.   NEUROLOGIC:  Good strength, sensation, and rapid alternating movements in the upper extremities, and good finger-nose testing.  Good strength and sensation in the lower extremities.      IMAGING:  Chest x-ray was seen by me.  It does show some vague infiltrate in the left lower lobe which, as noted by Radiology, is not well seen on the lateral view, but is clearly there.      LABS:  White count 10.9.  UA negative.  Chemistries essentially normal.      Influenza swab was positive.      ASSESSMENT:  Community-acquired pneumonia:  Suspect this may be due to acute influenza.  Because of the x-ray with infiltrate, the high fever, and the hypotension, we will go ahead and treat with Zithromax and Rocephin for the possibility of a CAP.  In the meantime, also Tamiflu.  We will recheck a chest x-ray in the morning.  If infiltrate appears resolved and she is improved, would discharge if vital signs are stable.  She is therefore made observation at this point.         VICK SABA MD             D: 2017 23:19   T: 2017 02:53   MT: KATHY#[id number]      Name:     ISAAK KERR   MRN:      [id number]        Account:      [id number]   :      1975           Admitted:     [id number]      Document: [id number]       cc: Lili Tyson MD

## 2017-02-08 NOTE — ED NOTES
Orthostatic VS obtained. Pt denies feeling any worse dizzy or lightheaded standing than lying, states she just generally feels weak and dizzy.

## 2017-02-08 NOTE — PLAN OF CARE
Problem: Pneumonia (Adult)  Goal: Signs and Symptoms of Listed Potential Problems Will be Absent or Manageable (Pneumonia)  Signs and symptoms of listed potential problems will be absent or manageable by discharge/transition of care (reference Pneumonia (Adult) CPG).   Outcome: No Change  Patient temperature down this afternoon 98.3-B/p mildly lower at this time. 95/55. Patient states  vicodin helped with moderate chest and back discomfort. Ibuprofen given as well.Oxygen saturation 97% on r/a.Pt denies any breathing difficulty.LS clear on upper and very diminished bilat LL.Will continue plan of care and notify MD or changes.

## 2017-02-08 NOTE — PROGRESS NOTES
[facility]      Subjective:  She feels better  Cough noted  Less lightheaded  Weak  myalgia    Review of Systems:  CONSTITUTIONAL:weak, chills  I: NEGATIVE for worrisome rashes, moles or lesions  E: NEGATIVE for vision changes or irritation  E/M: NEGATIVE for ear, mouth and throat problems  RESP:cough, no sob  B: NEGATIVE for masses, tenderness or discharge  CV: NEGATIVE for chest pain, palpitations or peripheral edema  GI: NEGATIVE for nausea, abdominal pain, heartburn, or change in bowel habits  : NEGATIVE for frequency, dysuria, or hematuria  MUSCULOSKELETAL:myalgia  NEURO: weak  E: NEGATIVE for temperature intolerance, skin/hair changes  H: NEGATIVE for bleeding problems  P: NEGATIVE for changes in mood or affect    Physical Exam:  Vitals Were Reviewed    Patient Vitals for the past 16 hrs:   BP Temp Temp src Pulse Heart Rate Resp SpO2 Height Weight   02/08/17 0747 99/58 mmHg 98.2  F (36.8  C) Oral 81 - 18 94 % - -   02/08/17 0425 95/51 mmHg - - - 88 17 97 % - -   02/08/17 0352 (!) 87/49 mmHg 98.6  F (37  C) Oral - 78 16 93 % - -   02/08/17 0013 90/50 mmHg 98.6  F (37  C) Oral - 96 16 94 % - -   02/07/17 2150 105/66 mmHg 98.2  F (36.8  C) Oral - 89 16 96 % 1.575 m (5' 2\") 62.1 kg (136 lb 14.5 oz)   02/07/17 2131 - 98.3  F (36.8  C) Oral - - - - - -   02/07/17 2045 (!) 87/61 mmHg - - - - - 96 % - -   02/07/17 2030 (!) 88/60 mmHg - - - - - 96 % - -   02/07/17 2015 (!) 88/53 mmHg - - - - - 95 % - -   02/07/17 2003 (!) 88/57 mmHg - - - 90 - 97 % - -   02/07/17 2002 (!) 83/53 mmHg - - - 79 - 97 % - -   02/07/17 2000 (!) 83/46 mmHg - - - 80 - 96 % - -   02/07/17 1945 (!) 87/59 mmHg - - - - - 97 % - -   02/07/17 1930 (!) 86/51 mmHg - - - - - 96 % - -   02/07/17 1923 (!) 82/50 mmHg - - - - - 96 % - -   02/07/17 1834 (!) 89/55 mmHg - - - - - - - -   02/07/17 1800 (!) 86/53 mmHg - - - - - 96 % - -   02/07/17 1745 90/53 mmHg - - - - - 97 % - -   02/07/17 1732 91/56 mmHg - - - - - 96 % - - "   02/07/17 1730 (!) 86/55 mmHg - - - - - 97 % - -   02/07/17 1715 (!) 88/55 mmHg - - - - - 96 % - -   02/07/17 1645 (!) 86/54 mmHg - - - - - - - -   02/07/17 1630 (!) 87/50 mmHg - - - - - 94 % - -   02/07/17 1615 93/52 mmHg - - - - - 95 % - -         Intake/Output Summary (Last 24 hours) at 02/08/17 0807  Last data filed at 02/08/17 0500   Gross per 24 hour   Intake   1300 ml   Output    550 ml   Net    750 ml       GENERAL APPEARANCE: alert nad  EYES: conjunctiva clear, eyes grossly normal  HENT: external ears and nose normal   NECK: supple, no masses or adenopathy  RESP: crackles left base  CV: regular rate and rhythm, normal S1 S2, no S3 or S4 and no murmur, click or rub   ABDOMEN: soft, nontender, no HSM or masses and bowel sounds normal  MS: no clubbing, cyanosis; no edema  SKIN: clear without significant rashes or lesions  NEURO: Normal strength and tone, sensory exam grossly normal, mentation intact and speech normal    Lab:  Recent Labs   Lab Test  02/08/17   0650  02/07/17   1305   NA  144  136   POTASSIUM  4.0  3.5   CHLORIDE  113*  101   CO2  23  25   ANIONGAP  8  10   GLC  74  107*   BUN  12  12   CR  0.63  0.85   AMX  6.7*  8.3*     CBC RESULTS:   Recent Labs   Lab Test  02/08/17   0650  02/07/17   1305   WBC  8.5  10.9   RBC  3.62*  4.56   HGB  10.7*  13.5   HCT  33.8*  41.1   PLT  138*  171       Results for orders placed or performed during the hospital encounter of 02/07/17 (from the past 24 hour(s))   Rapid Strep Screen   Result Value Ref Range    Specimen Description Throat     Rapid Strep A Screen       NEGATIVE: No Group A streptococcal antigen detected by immunoassay, await   culture report.      Micro Report Status FINAL 02/07/2017    Influenza A/B antigen   Result Value Ref Range    Influenza A/B Agn Specimen Nasopharyngeal     Influenza A (A) NEG     Positive   Test results must be correlated with clinical data. If necessary, results   should be confirmed by a molecular assay or viral  culture.      Influenza B  NEG     Negative   Test results must be correlated with clinical data. If necessary, results   should be confirmed by a molecular assay or viral culture.     Comprehensive metabolic panel   Result Value Ref Range    Sodium 136 133 - 144 mmol/L    Potassium 3.5 3.4 - 5.3 mmol/L    Chloride 101 94 - 109 mmol/L    Carbon Dioxide 25 20 - 32 mmol/L    Anion Gap 10 3 - 14 mmol/L    Glucose 107 (H) 70 - 99 mg/dL    Urea Nitrogen 12 7 - 30 mg/dL    Creatinine 0.85 0.52 - 1.04 mg/dL    GFR Estimate 73 >60 mL/min/1.7m2    GFR Estimate If Black 89 >60 mL/min/1.7m2    Calcium 8.3 (L) 8.5 - 10.1 mg/dL    Bilirubin Total 0.6 0.2 - 1.3 mg/dL    Albumin 3.7 3.4 - 5.0 g/dL    Protein Total 7.6 6.8 - 8.8 g/dL    Alkaline Phosphatase 54 40 - 150 U/L    ALT 33 0 - 50 U/L    AST 32 0 - 45 U/L   CBC with platelets differential   Result Value Ref Range    WBC 10.9 4.0 - 11.0 10e9/L    RBC Count 4.56 3.8 - 5.2 10e12/L    Hemoglobin 13.5 11.7 - 15.7 g/dL    Hematocrit 41.1 35.0 - 47.0 %    MCV 90 78 - 100 fl    MCH 29.6 26.5 - 33.0 pg    MCHC 32.8 31.5 - 36.5 g/dL    RDW 12.4 10.0 - 15.0 %    Platelet Count 171 150 - 450 10e9/L    Diff Method Automated Method     % Neutrophils 91.4 %    % Lymphocytes 1.6 %    % Monocytes 6.6 %    % Eosinophils 0.0 %    % Basophils 0.0 %    % Immature Granulocytes 0.4 %    Absolute Neutrophil 10.0 (H) 1.6 - 8.3 10e9/L    Absolute Lymphocytes 0.2 (L) 0.8 - 5.3 10e9/L    Absolute Monocytes 0.7 0.0 - 1.3 10e9/L    Absolute Eosinophils 0.0 0.0 - 0.7 10e9/L    Absolute Basophils 0.0 0.0 - 0.2 10e9/L    Abs Immature Granulocytes 0.0 0 - 0.4 10e9/L   Lactic acid whole blood   Result Value Ref Range    Lactic Acid 0.9 0.7 - 2.1 mmol/L   Lipase   Result Value Ref Range    Lipase 183 73 - 393 U/L   Chest XR,  PA & LAT    Narrative    XR CHEST 2 VW 2/7/2017 1:58 PM    HISTORY: Cough. Short of breath.    COMPARISON: None.      Impression    IMPRESSION: 2 views of the chest show small amount of  vague opacity at  the left lung base on the frontal view which is not well seen on the  lateral view. This could represent early pneumonia in the appropriate  clinical setting. Heart size and mediastinal contours are normal.     BERNADETTE TAVAREZ MD   UA reflex to Microscopic   Result Value Ref Range    Color Urine Yellow     Appearance Urine Clear     Glucose Urine Negative NEG mg/dL    Bilirubin Urine Negative NEG    Ketones Urine 80 (A) NEG mg/dL    Specific Gravity Urine 1.017 1.003 - 1.035    Blood Urine Small (A) NEG    pH Urine 6.0 5.0 - 7.0 pH    Protein Albumin Urine 30 (A) NEG mg/dL    Urobilinogen mg/dL Normal 0.0 - 2.0 mg/dL    Nitrite Urine Negative NEG    Leukocyte Esterase Urine Negative NEG    Source Midstream Urine     RBC Urine 4 (H) 0 - 2 /HPF    WBC Urine 1 0 - 2 /HPF    Bacteria Urine Few (A) NEG /HPF    Squamous Epithelial /HPF Urine 2 (H) 0 - 1 /HPF    Mucous Urine Present (A) NEG /LPF   Lactic acid whole blood   Result Value Ref Range    Lactic Acid 0.7 0.7 - 2.1 mmol/L   CBC with platelets   Result Value Ref Range    WBC 8.5 4.0 - 11.0 10e9/L    RBC Count 3.62 (L) 3.8 - 5.2 10e12/L    Hemoglobin 10.7 (L) 11.7 - 15.7 g/dL    Hematocrit 33.8 (L) 35.0 - 47.0 %    MCV 93 78 - 100 fl    MCH 29.6 26.5 - 33.0 pg    MCHC 31.7 31.5 - 36.5 g/dL    RDW 13.1 10.0 - 15.0 %    Platelet Count 138 (L) 150 - 450 10e9/L   Basic metabolic panel   Result Value Ref Range    Sodium 144 133 - 144 mmol/L    Potassium 4.0 3.4 - 5.3 mmol/L    Chloride 113 (H) 94 - 109 mmol/L    Carbon Dioxide 23 20 - 32 mmol/L    Anion Gap 8 3 - 14 mmol/L    Glucose 74 70 - 99 mg/dL    Urea Nitrogen 12 7 - 30 mg/dL    Creatinine 0.63 0.52 - 1.04 mg/dL    GFR Estimate >90  Non  GFR Calc   >60 mL/min/1.7m2    GFR Estimate If Black >90   GFR Calc   >60 mL/min/1.7m2    Calcium 6.7 (L) 8.5 - 10.1 mg/dL   XR Chest 2 Views    Narrative    CHEST TWO VIEWS   2/8/2017 7:17 AM     HISTORY: Cough and shortness of  breath.    COMPARISON: 2/7/2017.      Impression    IMPRESSION: Since yesterday there has been progressive infiltrate in  the left lower lobe posteriorly consistent with worsening pneumonitis.  The remainder of the chest is negative and unchanged.        Assessment and Plan:    Community-acquired left lower lobe pneumonia with sepsis  February 8, 2017 afebrile, wbc ok, hr down, bp is better    Influenza A  On tamiflu    Discussion  Pneumonia now obvious on cxr. She seems to be responding to current antibiotics-so I will leave them as is.  If any decompensation-add vanco.  Change to admit status, continue iv fluids.  Calcium and hgb  are down-probably due to large volume hydration-follow.

## 2017-02-08 NOTE — PLAN OF CARE
Problem: Goal Outcome Summary  Goal: Goal Outcome Summary  Outcome: No Change  Pt denies dizziness/lightheadness, blood pressure at 0425 was 95/51. Pt A&Ox3. Ambulating with SBA, pt's gait noted to be steady. O2 sat 94-97% on room air, with a productive cough. Pt's was afebrile this shift. Denies any pain or N/V. IV fluids running at 125 ml/hr. Pt is able to make her needs be known.

## 2017-02-09 LAB
ANION GAP SERPL CALCULATED.3IONS-SCNC: 9 MMOL/L (ref 3–14)
BACTERIA SPEC CULT: NORMAL
BUN SERPL-MCNC: 8 MG/DL (ref 7–30)
CALCIUM SERPL-MCNC: 7.3 MG/DL (ref 8.5–10.1)
CHLORIDE SERPL-SCNC: 109 MMOL/L (ref 94–109)
CO2 SERPL-SCNC: 25 MMOL/L (ref 20–32)
CREAT SERPL-MCNC: 0.61 MG/DL (ref 0.52–1.04)
ERYTHROCYTE [DISTWIDTH] IN BLOOD BY AUTOMATED COUNT: 13 % (ref 10–15)
GFR SERPL CREATININE-BSD FRML MDRD: ABNORMAL ML/MIN/1.7M2
GLUCOSE SERPL-MCNC: 76 MG/DL (ref 70–99)
HCT VFR BLD AUTO: 34.1 % (ref 35–47)
HGB BLD-MCNC: 10.9 G/DL (ref 11.7–15.7)
MCH RBC QN AUTO: 29.5 PG (ref 26.5–33)
MCHC RBC AUTO-ENTMCNC: 32 G/DL (ref 31.5–36.5)
MCV RBC AUTO: 92 FL (ref 78–100)
MICRO REPORT STATUS: NORMAL
PLATELET # BLD AUTO: 144 10E9/L (ref 150–450)
POTASSIUM SERPL-SCNC: 3.9 MMOL/L (ref 3.4–5.3)
RBC # BLD AUTO: 3.69 10E12/L (ref 3.8–5.2)
SODIUM SERPL-SCNC: 143 MMOL/L (ref 133–144)
SPECIMEN SOURCE: NORMAL
TROPONIN I SERPL-MCNC: NORMAL UG/L (ref 0–0.04)
TROPONIN I SERPL-MCNC: NORMAL UG/L (ref 0–0.04)
WBC # BLD AUTO: 5.5 10E9/L (ref 4–11)

## 2017-02-09 PROCEDURE — 93005 ELECTROCARDIOGRAM TRACING: CPT

## 2017-02-09 PROCEDURE — 12000000 ZZH R&B MED SURG/OB

## 2017-02-09 PROCEDURE — 80048 BASIC METABOLIC PNL TOTAL CA: CPT | Performed by: FAMILY MEDICINE

## 2017-02-09 PROCEDURE — 36415 COLL VENOUS BLD VENIPUNCTURE: CPT | Performed by: FAMILY MEDICINE

## 2017-02-09 PROCEDURE — 25000132 ZZH RX MED GY IP 250 OP 250 PS 637: Performed by: FAMILY MEDICINE

## 2017-02-09 PROCEDURE — 84484 ASSAY OF TROPONIN QUANT: CPT | Performed by: FAMILY MEDICINE

## 2017-02-09 PROCEDURE — 85027 COMPLETE CBC AUTOMATED: CPT | Performed by: FAMILY MEDICINE

## 2017-02-09 PROCEDURE — 25000128 H RX IP 250 OP 636: Performed by: FAMILY MEDICINE

## 2017-02-09 PROCEDURE — 99233 SBSQ HOSP IP/OBS HIGH 50: CPT | Performed by: FAMILY MEDICINE

## 2017-02-09 PROCEDURE — 25000132 ZZH RX MED GY IP 250 OP 250 PS 637: Performed by: EMERGENCY MEDICINE

## 2017-02-09 RX ADMIN — CEFTRIAXONE 1 G: 1 INJECTION, POWDER, FOR SOLUTION INTRAMUSCULAR; INTRAVENOUS at 14:24

## 2017-02-09 RX ADMIN — IBUPROFEN 600 MG: 600 TABLET ORAL at 11:49

## 2017-02-09 RX ADMIN — ACETAMINOPHEN 650 MG: 325 TABLET, FILM COATED ORAL at 05:14

## 2017-02-09 RX ADMIN — AZITHROMYCIN 500 MG: 250 TABLET, FILM COATED ORAL at 07:43

## 2017-02-09 NOTE — PLAN OF CARE
Problem: Goal Outcome Summary  Goal: Goal Outcome Summary  Outcome: Improving  Pt c/o a headache this shift, given  mg Tylenol x1 and a cool washcloth applied to forehead. Pt denies dizziness/lightheadness. Her cough is frequent and productive, lung sounds are diminished with crackles heard in the lower left lobe. O2 sat 96-98% on room air. Pt blood pressures are stable tonight, last B/P was 111/63. Has been ambulating independently room, able to make her needs be known.

## 2017-02-09 NOTE — PROGRESS NOTES
"SUBJECTIVE:   Some diffuse substernal chest pain this AM, 2/10.  Better after an hour without any intervention.   Not pleuritic.   EKG some T wave inversions ant leads, but same after 4 hrs.   Trop negative.     OBJECTIVE:   /70 mmHg  Pulse 76  Temp(Src) 98.7  F (37.1  C) (Oral)  Resp 18  Ht 1.575 m (5' 2\")  Wt 62.1 kg (136 lb 14.5 oz)  BMI 25.03 kg/m2  SpO2 100%    GENERAL APPEARANCE:  Alert, NAD, no resp distress      RESP:rales left base      CV: regular rate and rhythm,  No  murmur , edema: none       Abdomen: soft, nontender, no liver or spleen enlargement, no masses, BSs normal   Skin: no cyanosis, pallor, or jaundice     CMP  Recent Labs  Lab 02/09/17  0705 02/08/17  0650 02/07/17  1305    144 136   POTASSIUM 3.9 4.0 3.5   CHLORIDE 109 113* 101   CO2 25 23 25   ANIONGAP 9 8 10   GLC 76 74 107*   BUN 8 12 12   CR 0.61 0.63 0.85   GFRESTIMATED >90Non  GFR Calc >90Non  GFR Calc 73   GFRESTBLACK >90African American GFR Calc >90African American GFR Calc 89   MAX 7.3* 6.7* 8.3*   PROTTOTAL  --   --  7.6   ALBUMIN  --   --  3.7   BILITOTAL  --   --  0.6   ALKPHOS  --   --  54   AST  --   --  32   ALT  --   --  33     CBC  Recent Labs  Lab 02/09/17  0705 02/08/17  0650 02/07/17  1305   WBC 5.5 8.5 10.9   RBC 3.69* 3.62* 4.56   HGB 10.9* 10.7* 13.5   HCT 34.1* 33.8* 41.1   MCV 92 93 90   MCH 29.5 29.6 29.6   MCHC 32.0 31.7 32.8   RDW 13.0 13.1 12.4   * 138* 171     INRNo lab results found in last 7 days.  Arterial BloodGas  No lab results found in last 7 days.   Venous Blood Gas  No lab results found in last 7 days.    Medications     sodium chloride (PF)  3 mL Intravenous Q8H     oseltamivir  75 mg Oral BID     cefTRIAXone  1 g Intravenous Q24H     azithromycin  500 mg Oral Daily       Intake/Output Summary (Last 24 hours) at 02/09/17 1217  Last data filed at 02/09/17 1030   Gross per 24 hour   Intake   2368 ml   Output   1100 ml   Net   1268 ml     "     ASSESSMENT: PLAN:   Assessment and Plan:    Sepsis (tachy, low BP, low Plts, fever)  due to Community-acquired left lower lobe pneumonia  February 8, 2017 afebrile, wbc ok, hr down, bp is better    Influenza A  On tamiflu    Discussion  Pneumonia now obvious on cxr. She seems to be responding to current antibiotics-so I will leave them as is.  If any decompensation-add vanco.  Change to admit status, continue iv fluids.  Calcium and hgb  are down-probably due to large volume hydration-follow.

## 2017-02-09 NOTE — PLAN OF CARE
Patient complains chest pain 2 1-10 mid sternal  The pain is not worse with deep breathing, vital signs stable updated rounding MD he ordered EKG and troponin will continue to monitor

## 2017-02-09 NOTE — PLAN OF CARE
Problem: Pneumonia (Adult)  Goal: Signs and Symptoms of Listed Potential Problems Will be Absent or Manageable (Pneumonia)  Signs and symptoms of listed potential problems will be absent or manageable by discharge/transition of care (reference Pneumonia (Adult) CPG).   Outcome: Declining  Patient had small emesis and Tamiflu capsule came up  Patient does not want another one tonite now  Still a little nauseated but refusing antimetic at this time

## 2017-02-10 VITALS
OXYGEN SATURATION: 97 % | RESPIRATION RATE: 16 BRPM | BODY MASS INDEX: 25.19 KG/M2 | WEIGHT: 136.91 LBS | HEART RATE: 66 BPM | HEIGHT: 62 IN | TEMPERATURE: 97 F | DIASTOLIC BLOOD PRESSURE: 65 MMHG | SYSTOLIC BLOOD PRESSURE: 112 MMHG

## 2017-02-10 LAB
ALBUMIN SERPL-MCNC: 2.6 G/DL (ref 3.4–5)
ALP SERPL-CCNC: 48 U/L (ref 40–150)
ALT SERPL W P-5'-P-CCNC: 40 U/L (ref 0–50)
ANION GAP SERPL CALCULATED.3IONS-SCNC: 8 MMOL/L (ref 3–14)
AST SERPL W P-5'-P-CCNC: 24 U/L (ref 0–45)
BILIRUB SERPL-MCNC: 0.2 MG/DL (ref 0.2–1.3)
BUN SERPL-MCNC: 5 MG/DL (ref 7–30)
CALCIUM SERPL-MCNC: 7.5 MG/DL (ref 8.5–10.1)
CHLORIDE SERPL-SCNC: 108 MMOL/L (ref 94–109)
CO2 SERPL-SCNC: 27 MMOL/L (ref 20–32)
CREAT SERPL-MCNC: 0.58 MG/DL (ref 0.52–1.04)
ERYTHROCYTE [DISTWIDTH] IN BLOOD BY AUTOMATED COUNT: 12.8 % (ref 10–15)
GFR SERPL CREATININE-BSD FRML MDRD: ABNORMAL ML/MIN/1.7M2
GLUCOSE SERPL-MCNC: 76 MG/DL (ref 70–99)
HCT VFR BLD AUTO: 34.3 % (ref 35–47)
HGB BLD-MCNC: 11 G/DL (ref 11.7–15.7)
MCH RBC QN AUTO: 29.3 PG (ref 26.5–33)
MCHC RBC AUTO-ENTMCNC: 32.1 G/DL (ref 31.5–36.5)
MCV RBC AUTO: 91 FL (ref 78–100)
PLATELET # BLD AUTO: 172 10E9/L (ref 150–450)
POTASSIUM SERPL-SCNC: 3.8 MMOL/L (ref 3.4–5.3)
PROT SERPL-MCNC: 5.6 G/DL (ref 6.8–8.8)
RBC # BLD AUTO: 3.76 10E12/L (ref 3.8–5.2)
SODIUM SERPL-SCNC: 143 MMOL/L (ref 133–144)
TROPONIN I SERPL-MCNC: NORMAL UG/L (ref 0–0.04)
WBC # BLD AUTO: 4.6 10E9/L (ref 4–11)

## 2017-02-10 PROCEDURE — 40000275 ZZH STATISTIC RCP TIME EA 10 MIN

## 2017-02-10 PROCEDURE — 25000128 H RX IP 250 OP 636: Performed by: FAMILY MEDICINE

## 2017-02-10 PROCEDURE — 25000132 ZZH RX MED GY IP 250 OP 250 PS 637: Performed by: FAMILY MEDICINE

## 2017-02-10 PROCEDURE — 80053 COMPREHEN METABOLIC PANEL: CPT | Performed by: FAMILY MEDICINE

## 2017-02-10 PROCEDURE — 84484 ASSAY OF TROPONIN QUANT: CPT | Performed by: FAMILY MEDICINE

## 2017-02-10 PROCEDURE — 36415 COLL VENOUS BLD VENIPUNCTURE: CPT | Performed by: FAMILY MEDICINE

## 2017-02-10 PROCEDURE — 93005 ELECTROCARDIOGRAM TRACING: CPT

## 2017-02-10 PROCEDURE — 25000132 ZZH RX MED GY IP 250 OP 250 PS 637: Performed by: EMERGENCY MEDICINE

## 2017-02-10 PROCEDURE — 85027 COMPLETE CBC AUTOMATED: CPT | Performed by: FAMILY MEDICINE

## 2017-02-10 PROCEDURE — 99239 HOSP IP/OBS DSCHRG MGMT >30: CPT | Performed by: FAMILY MEDICINE

## 2017-02-10 RX ORDER — CEFDINIR 300 MG/1
300 CAPSULE ORAL 2 TIMES DAILY
Qty: 14 CAPSULE | Refills: 0 | Status: SHIPPED | OUTPATIENT
Start: 2017-02-10 | End: 2017-08-16

## 2017-02-10 RX ORDER — ASPIRIN 81 MG/1
81 TABLET, CHEWABLE ORAL DAILY
Status: DISCONTINUED | OUTPATIENT
Start: 2017-02-10 | End: 2017-02-10 | Stop reason: HOSPADM

## 2017-02-10 RX ORDER — CODEINE PHOSPHATE AND GUAIFENESIN 10; 100 MG/5ML; MG/5ML
1-2 SOLUTION ORAL EVERY 4 HOURS PRN
Qty: 120 ML | Refills: 1 | Status: SHIPPED | OUTPATIENT
Start: 2017-02-10 | End: 2017-02-17

## 2017-02-10 RX ADMIN — IBUPROFEN 600 MG: 600 TABLET ORAL at 02:49

## 2017-02-10 RX ADMIN — HYDROCODONE BITARTRATE AND ACETAMINOPHEN 1 TABLET: 5; 325 TABLET ORAL at 04:46

## 2017-02-10 RX ADMIN — AZITHROMYCIN 500 MG: 250 TABLET, FILM COATED ORAL at 07:35

## 2017-02-10 RX ADMIN — ASPIRIN 81 MG 81 MG: 81 TABLET ORAL at 04:43

## 2017-02-10 RX ADMIN — SODIUM CHLORIDE: 9 INJECTION, SOLUTION INTRAVENOUS at 04:47

## 2017-02-10 NOTE — DISCHARGE SUMMARY
[facility] Discharge Summary    Leila Azul MRN# [id number]   Age: 41 year old YOB: 1975     Date of Admission:  2/7/2017  Date of Discharge::  2/10/2017  9:20 AM  Admitting Physician:  Duran Morgan MD  Discharge Physician:  Marcel Nieves MD  Primary Physician: Lili Tyson       Home clinic: [facility]               Discharge Diagnosis:   Principle diagnosis:   Sepsis due to CAP    Secondary diagnoses:  Acute influenza      Discharge Instructions:   omnicef 300 mg 2 times/day for 7 more days  Robitussin AC 1-2 tsp every 4 hrs as needed for cough  Off work 7 days  recheck in clinic in the next week  Recheck chest x-ray in 6 weeks to ensure clearing.     Follow up with primary care provider in 7  days        Procedures:       Results for orders placed or performed during the hospital encounter of 02/07/17   Chest XR,  PA & LAT    Narrative    XR CHEST 2 VW 2/7/2017 1:58 PM    HISTORY: Cough. Short of breath.    COMPARISON: None.      Impression    IMPRESSION: 2 views of the chest show small amount of vague opacity at  the left lung base on the frontal view which is not well seen on the  lateral view. This could represent early pneumonia in the appropriate  clinical setting. Heart size and mediastinal contours are normal.     BERNADETTE TAVAREZ MD   XR Chest 2 Views    Narrative    CHEST TWO VIEWS   2/8/2017 7:17 AM     HISTORY: Cough and shortness of breath.    COMPARISON: 2/7/2017.      Impression    IMPRESSION: Since yesterday there has been progressive infiltrate in  the left lower lobe posteriorly consistent with worsening pneumonitis.  The remainder of the chest is negative and unchanged.     BELTRAN BRUSH MD                    Allergies:      Allergies   Allergen Reactions     Amoxicillin Rash     Trimethoprim Sulfate Other (See Comments)     Vomiting blood after taking medication.     Doxycycline Rash                  Discharge Medications:     Current  Discharge Medication List      START taking these medications    Details   cefdinir (OMNICEF) 300 MG capsule Take 1 capsule (300 mg) by mouth 2 times daily  Qty: 14 capsule, Refills: 0    Associated Diagnoses: Pneumonia of left lower lobe due to infectious organism      guaiFENesin-codeine (ROBITUSSIN AC) 100-10 MG/5ML SOLN solution Take 5-10 mLs by mouth every 4 hours as needed for cough  Qty: 120 mL, Refills: 1    Associated Diagnoses: Pneumonia of left lower lobe due to infectious organism         CONTINUE these medications which have NOT CHANGED    Details   ibuprofen (ADVIL,MOTRIN) 400 MG tablet Take 1-2 tablets (400-800 mg) by mouth every 6 hours as needed for other (cramping)  Qty: 120 tablet, Refills: 0    Associated Diagnoses:  (normal spontaneous vaginal delivery)                   Consultations:   None           Brief History of Presenting Illness:     Leila Azul comes in with a slightly less than 48-hour history of fairly sudden onset of symptoms including burning across the chest, followed by fevers, chills and cough all coming on in fairly rapid succession.  She started developing some nasal congestion and coryza over the next 12 hours.  Fevers have been persistent.  She has not eaten much of any food, but has been keeping up with fluids, but noticed she has not urinated much.  She started getting lightheaded today and finally came into the ED.        On admission, she was noted to be febrile to 103.0, with a blood pressure of 82/50.  She was given 4 liters of IV fluids in the ED, with blood pressure still in the 80s, 87/61, but marked improvement in her general subjective status.            Hospital Course:   Sepsis (tachy, low BP, low Plts, fever)  due to Community-acquired left lower lobe pneumonia  2017 afebrile, wbc ok, hr down, bp is better    Influenza A  On tamiflu    Discussion  Pneumonia now obvious on cxr. She seems to be responding to current antibiotics-so I will leave  "them as is.  If any decompensation-add vanco.  Calcium and hgb  are down-probably due to large volume hydration-follow.            Discharge Exam:   /70 mmHg  Pulse 76  Temp(Src) 98.7  F (37.1  C) (Oral)  Resp 18  Ht 1.575 m (5' 2\")  Wt 62.1 kg (136 lb 14.5 oz)  BMI 25.03 kg/m2  SpO2 100%    GENERAL APPEARANCE:  Alert, NAD, no resp distress       RESP:rales left base       CV: regular rate and rhythm,  No  murmur , edema: none        Abdomen: soft, nontender, no liver or spleen enlargement, no masses, BSs normal    Skin: no cyanosis, pallor, or jaundice            Pending Tests at Discharge:     Unresulted Labs Ordered in the Past 30 Days of this Admission     No orders found from 12/10/2016 to 2/8/2017.                   Discharge Disposition:   Discharged to home      Attestation:  Amount of time performed on this discharge : 45 minutes.    Marcel Nieves MD         "

## 2017-02-10 NOTE — PROGRESS NOTES
Patient requested some ibuprofen for headache.  When asked her if she was feeling any chest pain, she stated yes.  She reports this pain is across her chest and extends down abdomen.  She states it is exactly the same as yesterday morning, pain is 1-2/10.  She reports the pain vanished after yesterday mornings episode and now has been present for about 1 hour.  STAT EKG ordered, RT updated.  Will wait for results and review with on-call.

## 2017-02-10 NOTE — DISCHARGE INSTRUCTIONS
omnicef 300 mg 2 times/day for 7 more days  Robitussin AC 1-2 tsp every 4 hrs as needed for cough  Off work 7 days  recheck in clinic in the next week  Recheck chest x-ray in 6 weeks to ensure clearing.

## 2017-02-10 NOTE — PROGRESS NOTES
"No EKG changes based on comparison of previous two yesterday.  Call placed to Dr. Kahn to update him on situation.  Patient's headache is improved with ibuprofen and maybe a little improved in chest.  Will start baby ASA, first dose now.  Will leave sticky note in chart for today's rounder to update.  She has a am troponin already ordered.  Patient states, \" I am sure it is from coughing so much.\"  "

## 2017-02-10 NOTE — PROGRESS NOTES
[facility] DISCHARGE NOTE    Patient discharged to home at 9:20 AM via ambulation. Accompanied by spouse and staff. Discharge instructions reviewed with patient, opportunity offered to ask questions. Prescriptions sent to patients preferred pharmacy. All belongings sent with patient.    Sherri Tyson

## 2017-02-10 NOTE — PLAN OF CARE
"Problem: Pneumonia (Adult)  Goal: Signs and Symptoms of Listed Potential Problems Will be Absent or Manageable (Pneumonia)  Signs and symptoms of listed potential problems will be absent or manageable by discharge/transition of care (reference Pneumonia (Adult) CPG).   Outcome: Improving  Pt states she is feeling better but really looks like she is still pretty ill and looks exhausted.  Coughing frequently, stating \"my lungs hurt and my head hurts.\"  Refuses to take anything, even tylenol at this point.  There has been no further chest pain and pt was again reminded to call at the first sign of chest pian.  CAMMIE Nettles RN        "

## 2017-02-17 ENCOUNTER — OFFICE VISIT (OUTPATIENT)
Dept: FAMILY MEDICINE | Facility: CLINIC | Age: 42
End: 2017-02-17
Payer: COMMERCIAL

## 2017-02-17 VITALS
OXYGEN SATURATION: 96 % | WEIGHT: 137 LBS | SYSTOLIC BLOOD PRESSURE: 99 MMHG | BODY MASS INDEX: 25.21 KG/M2 | HEART RATE: 91 BPM | RESPIRATION RATE: 18 BRPM | TEMPERATURE: 98.4 F | DIASTOLIC BLOOD PRESSURE: 64 MMHG | HEIGHT: 62 IN

## 2017-02-17 DIAGNOSIS — J10.1 INFLUENZA A: Primary | ICD-10-CM

## 2017-02-17 DIAGNOSIS — J18.9 COMMUNITY ACQUIRED PNEUMONIA: ICD-10-CM

## 2017-02-17 LAB
BASOPHILS # BLD AUTO: 0 10E9/L (ref 0–0.2)
BASOPHILS NFR BLD AUTO: 0.4 %
DIFFERENTIAL METHOD BLD: NORMAL
EOSINOPHIL # BLD AUTO: 0.2 10E9/L (ref 0–0.7)
EOSINOPHIL NFR BLD AUTO: 2.4 %
ERYTHROCYTE [DISTWIDTH] IN BLOOD BY AUTOMATED COUNT: 12.4 % (ref 10–15)
HCT VFR BLD AUTO: 42.5 % (ref 35–47)
HGB BLD-MCNC: 13.8 G/DL (ref 11.7–15.7)
IMM GRANULOCYTES # BLD: 0.1 10E9/L (ref 0–0.4)
IMM GRANULOCYTES NFR BLD: 1.6 %
LYMPHOCYTES # BLD AUTO: 1.6 10E9/L (ref 0.8–5.3)
LYMPHOCYTES NFR BLD AUTO: 20.7 %
MCH RBC QN AUTO: 29.1 PG (ref 26.5–33)
MCHC RBC AUTO-ENTMCNC: 32.5 G/DL (ref 31.5–36.5)
MCV RBC AUTO: 90 FL (ref 78–100)
MONOCYTES # BLD AUTO: 0.8 10E9/L (ref 0–1.3)
MONOCYTES NFR BLD AUTO: 10.4 %
NEUTROPHILS # BLD AUTO: 4.9 10E9/L (ref 1.6–8.3)
NEUTROPHILS NFR BLD AUTO: 64.5 %
PLATELET # BLD AUTO: 418 10E9/L (ref 150–450)
RBC # BLD AUTO: 4.74 10E12/L (ref 3.8–5.2)
WBC # BLD AUTO: 7.6 10E9/L (ref 4–11)

## 2017-02-17 PROCEDURE — 85025 COMPLETE CBC W/AUTO DIFF WBC: CPT | Performed by: FAMILY MEDICINE

## 2017-02-17 PROCEDURE — 99214 OFFICE O/P EST MOD 30 MIN: CPT | Performed by: FAMILY MEDICINE

## 2017-02-17 PROCEDURE — 36415 COLL VENOUS BLD VENIPUNCTURE: CPT | Performed by: FAMILY MEDICINE

## 2017-02-17 NOTE — MR AVS SNAPSHOT
After Visit Summary   2/17/2017    Leila Azul    MRN: [id number]           Patient Information     Date Of Birth          1975        Visit Information        Provider Department      2/17/2017 11:20 AM Lili Tyson MD [facility]        Today's Diagnoses     Influenza A    -  1    Community acquired pneumonia          Care Instructions          Thank you for choosing [facility].  You may be receiving a survey in the mail from [facility] regarding your visit today.  Please take a few minutes to complete and return the survey to let us know how we are doing.      Our Clinic hours are:  Mondays    7:20 am - 7 pm  Tues -  Fri  7:20 am - 5 pm    Clinic Phone: [phone]    The clinic lab opens at 7:30 am Mon - Fri and appointments are required.    [facility]  Ph. [phone]  Monday-Thursday 8 am - 7pm  Tues/Wed/Fri 8 am - 5:30 pm               Follow-ups after your visit        Future tests that were ordered for you today     Open Future Orders        Priority Expected Expires Ordered    XR Chest 2 Views Routine 2/24/2017 2/17/2018 2/17/2017            Who to contact     If you have questions or need follow up information about today's clinic visit or your schedule please contact [facility] directly at [phone].  Normal or non-critical lab and imaging results will be communicated to you by MyChart, letter or phone within 4 business days after the clinic has received the results. If you do not hear from us within 7 days, please contact the clinic through MyChart or phone. If you have a critical or abnormal lab result, we will notify you by phone as soon as possible.  Submit refill requests through [employer] or call your pharmacy and they will forward the refill request to us. Please allow 3 business days for your refill to be completed.          Additional Information About Your Visit        MyChart Information     [employer]  "gives you secure access to your electronic health record. If you see a primary care provider, you can also send messages to your care team and make appointments. If you have questions, please call your primary care clinic.  If you do not have a primary care provider, please call [phone] and they will assist you.        Care EveryWhere ID     This is your Care EveryWhere ID. This could be used by other organizations to access your [place] medical records  [id number]        Your Vitals Were     Pulse Temperature Respirations Height Pulse Oximetry Breastfeeding?    91 98.4  F (36.9  C) (Tympanic) 18 5' 2\" (1.575 m) 96% No    BMI (Body Mass Index)                   25.06 kg/m2            Blood Pressure from Last 3 Encounters:   02/17/17 99/64   02/10/17 112/65   12/20/16 98/56    Weight from Last 3 Encounters:   02/17/17 137 lb (62.1 kg)   02/07/17 136 lb 14.5 oz (62.1 kg)   12/20/16 130 lb (59 kg)              We Performed the Following     CBC with platelets differential        Primary Care Provider Office Phone # Fax #    Lili Tyson [phone]       [facility] [address] [place] [address]        Thank you!     Thank you for choosing [facility]  for your care. Our goal is always to provide you with excellent care. Hearing back from our patients is one way we can continue to improve our services. Please take a few minutes to complete the written survey that you may receive in the mail after your visit with us. Thank you!             Your Updated Medication List - Protect others around you: Learn how to safely use, store and throw away your medicines at www.disposemymeds.org.          This list is accurate as of: 2/17/17 12:04 PM.  Always use your most recent med list.                   Brand Name Dispense Instructions for use    cefdinir 300 MG capsule    OMNICEF    14 capsule    Take 1 capsule (300 mg) by mouth 2 times daily         "

## 2017-02-17 NOTE — PROGRESS NOTES
"  SUBJECTIVE:                                                    Leila Azul is a 41 year old female who presents to clinic today for the following health issues:          Hospital Follow-up Visit:    Hospital/Nursing Home/IP Rehab Facility: [facility]  Date of Admission: 2/7/17  Date of Discharge: 2/10/17  Reason(s) for Admission: influenza and pneumonia            Problems taking medications regularly:  None       Medication changes since discharge: None       Problems adhering to non-medication therapy:  None    Summary of hospitalization:  [facility] discharge summary reviewed  Diagnostic Tests/Treatments reviewed.  Follow up needed: none  Other Healthcare Providers Involved in Patient s Care:         None  Update since discharge: improved.     Post Discharge Medication Reconciliation: discharge medications reconciled, continue medications without change.  Plan of care communicated with patient     Coding guidelines for this visit:  Type of Medical   Decision Making Face-to-Face Visit       within 7 Days of discharge Face-to-Face Visit        within 14 days of discharge   Moderate Complexity [id number]   High Complexity [id number]        Hospital notes/labs reviewed.  CHEST X RAY from 2/8/2017 reviewed.    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  After that her next shift is Tuesday 2/21/2017 and shifts are 12 hour shifts.    She is finishing her last dose of antibiotic today for the CAP.  She also had influenza A.  Breathing is easier.  Still coughing.  Some body aches.      BP 99/64 (BP Location: Right arm, Patient Position: Chair, Cuff Size: Adult Regular)  Pulse 91  Temp 98.4  F (36.9  C) (Tympanic)  Resp 18  Ht 5' 2\" (1.575 m)  Wt 137 lb (62.1 kg)  SpO2 96%  Breastfeeding? No  BMI 25.06 kg/m2  EXAM: GENERAL APPEARANCE: Alert, no acute distress  RESP: lungs clear to auscultation   CV: " normal rate, regular rhythm, no murmur or gallop  ABDOMEN: soft, no organomegaly, masses or tenderness    WBC 7k  Diff pending in [place].    ASSESSMENT/PLAN:      ICD-10-CM    1. Influenza A J10.1 XR Chest 2 Views     CBC with platelets differential   2. Community acquired pneumonia J18.9 XR Chest 2 Views     CBC with platelets differential     Letter for work with return date 2/21/2017.    Patient to let me know Monday if she's not feeling up to that and would have her come back for follow up chest x ray at that time, otherwise, if feeling better would have her repeat her chest x ray next Friday (1 week) to ensure resolution of the LLL infiltrate.    Lili Tyson M.D.      Patient Instructions         Thank you for choosing [facility].  You may be receiving a survey in the mail from [employer] regarding your visit today.  Please take a few minutes to complete and return the survey to let us know how we are doing.      Our Clinic hours are:  Mondays    7:20 am - 7 pm  Tues -  Fri  7:20 am - 5 pm    Clinic Phone: [phone]    The clinic lab opens at 7:30 am Mon - Fri and appointments are required.    [facility]. [phone]  Monday-Thursday 8 am - 7pm  Tues/Wed/Fri 8 am - 5:30 pm

## 2017-02-17 NOTE — PATIENT INSTRUCTIONS
Thank you for choosing [facility].  You may be receiving a survey in the mail from [facility] regarding your visit today.  Please take a few minutes to complete and return the survey to let us know how we are doing.      Our Clinic hours are:  Mondays    7:20 am - 7 pm  Tues -  Fri  7:20 am - 5 pm    Clinic Phone: [phone]    The clinic lab opens at 7:30 am Mon - Fri and appointments are required.    [facility]. [phone]  Monday-Thursday 8 am - 7pm  Tues/Wed/Fri 8 am - 5:30 pm

## 2017-02-17 NOTE — NURSING NOTE
"Chief Complaint   Patient presents with     Hospital F/U       Initial BP 99/64 (BP Location: Right arm, Patient Position: Chair, Cuff Size: Adult Regular)  Pulse 91  Temp 98.4  F (36.9  C) (Tympanic)  Resp 18  Ht 5' 2\" (1.575 m)  Wt 137 lb (62.1 kg)  SpO2 96%  Breastfeeding? No  BMI 25.06 kg/m2 Estimated body mass index is 25.06 kg/(m^2) as calculated from the following:    Height as of this encounter: 5' 2\" (1.575 m).    Weight as of this encounter: 137 lb (62.1 kg).  Medication Reconciliation: complete    "

## 2017-02-17 NOTE — LETTER
[facility]  [address]  [facility] [address]  Phone: [phone]     February 17, 2017      Leila Azul  [address] [address] [facility] [address]              To whom it may concern,    Leila Azul was seen in our clinic today for medical illness for the last 10 days.  Anticipate return to work or school by 2/21/2017.       Sincerely,        Lili Tyson MD

## 2017-02-20 NOTE — PROGRESS NOTES
Leila,    All of the labs were normal or acceptable.  Hope you're continuing to feel better.    Please contact my office if you have questions.    Lili Tyson M.D.

## 2017-03-16 ENCOUNTER — TELEPHONE (OUTPATIENT)
Dept: FAMILY MEDICINE | Facility: CLINIC | Age: 42
End: 2017-03-16

## 2017-03-16 NOTE — TELEPHONE ENCOUNTER
Panel Management Review      Patient has the following on her problem list: None      Composite cancer screening  Chart review shows that this patient is due/due soon for the following Pap Smear  Summary:    Patient is due/failing the following:   PAP    Action needed:   Patient needs office visit for pap.    Type of outreach:    Phone, spoke to patient.  agreed to complete pap    Questions for provider review:    None                                                                                                                                    Mary Ames MA       Chart routed to Care Team .

## 2017-05-03 ENCOUNTER — TELEPHONE (OUTPATIENT)
Dept: FAMILY MEDICINE | Facility: CLINIC | Age: 42
End: 2017-05-03

## 2017-05-03 NOTE — TELEPHONE ENCOUNTER
5/3/2017    Call Regarding Preventive Health Screening Cervical/PAP    Attempt 1    Message SCHEDULED    Comments:         Outreach   THOMAS

## 2017-06-01 ENCOUNTER — OFFICE VISIT (OUTPATIENT)
Dept: OBGYN | Facility: CLINIC | Age: 42
End: 2017-06-01
Payer: COMMERCIAL

## 2017-06-01 VITALS
HEART RATE: 79 BPM | BODY MASS INDEX: 24.29 KG/M2 | HEIGHT: 62 IN | WEIGHT: 132 LBS | SYSTOLIC BLOOD PRESSURE: 106 MMHG | DIASTOLIC BLOOD PRESSURE: 70 MMHG

## 2017-06-01 DIAGNOSIS — Z00.00 ROUTINE GENERAL MEDICAL EXAMINATION AT A HEALTH CARE FACILITY: Primary | ICD-10-CM

## 2017-06-01 PROCEDURE — G0145 SCR C/V CYTO,THINLAYER,RESCR: HCPCS | Performed by: OBSTETRICS & GYNECOLOGY

## 2017-06-01 PROCEDURE — 87624 HPV HI-RISK TYP POOLED RSLT: CPT | Performed by: OBSTETRICS & GYNECOLOGY

## 2017-06-01 PROCEDURE — 99396 PREV VISIT EST AGE 40-64: CPT | Performed by: OBSTETRICS & GYNECOLOGY

## 2017-06-01 NOTE — NURSING NOTE
"Initial /70 (BP Location: Right arm, Patient Position: Chair, Cuff Size: Adult Small)  Pulse 79  Ht 5' 2\" (1.575 m)  Wt 132 lb (59.9 kg)  LMP 05/17/2017  BMI 24.14 kg/m2 Estimated body mass index is 24.14 kg/(m^2) as calculated from the following:    Height as of this encounter: 5' 2\" (1.575 m).    Weight as of this encounter: 132 lb (59.9 kg). .      "

## 2017-06-01 NOTE — PROGRESS NOTES
SUBJECTIVE:     CC: Leila Azul is an 41 year old woman who presents for preventive health visit.     Healthy Habits:    Do you get at least three servings of calcium containing foods daily (dairy, green leafy vegetables, etc.)? yes    Amount of exercise or daily activities, outside of work: no day(s) per week    Problems taking medications regularly No    Medication side effects: No    Have you had an eye exam in the past two years? yes    Do you see a dentist twice per year? yes    Do you have sleep apnea, excessive snoring or daytime drowsiness?no            Today's PHQ-2 Score:   PHQ-2 (  [employer]) 2017   Q1: Little interest or pleasure in doing things 0 0   Q2: Feeling down, depressed or hopeless 0 0   PHQ-2 Score 0 0       Abuse: Current or Past(Physical, Sexual or Emotional)- No  Do you feel safe in your environment - Yes    Social History   Substance Use Topics     Smoking status: Never Smoker     Smokeless tobacco: Never Used     Alcohol use No      Comment: rarely/quit with pregnancy     The patient does not drink >3 drinks per day nor >7 drinks per week.    No results for input(s): CHOL, HDL, LDL, TRIG, CHOLHDLRATIO, NHDL in the last 60455 hours.    Reviewed orders with patient.  Reviewed health maintenance and updated orders accordingly - Yes    Mammo Decision Support:  Patient under age 50, mutual decision reflected in health maintenance.      Pertinent mammograms are reviewed under the imaging tab.  History of abnormal Pap smear: NO - age 30- 65 PAP every 3 years recommended    Reviewed and updated as needed this visit by clinical staff  Tobacco  Allergies  Meds  Med Hx  Surg Hx  Fam Hx  Soc Hx        Reviewed and updated as needed this visit by Provider        Past Medical History:   Diagnosis Date     Anemia      increased blood loss after 1st delivery     Chickenpox       labor ,,    -bedrest/terbutaline wiith 1st two preg.      Past Surgical History:    Procedure Laterality Date     SURGICAL HISTORY OF -       eye surgery age 2     Obstetric History       T3      L4     SAB0   TAB0   Ectopic0   Multiple0   Live Births1       # Outcome Date GA Lbr John/2nd Weight Sex Delivery Anes PTL Lv   6 Term 14 39w1d 03:40 / 02:10 4.082 kg (9 lb) M Vag-Vacuum EPI N KRUNAL      Name: Max      Apgar1:  8                Apgar5: 8   5 SAB 2013 9w0d    SAB      4 Term 10/24/11 39w1d 05:20  00:25 3.83 kg (8 lb 7.1 oz) F Vag-Spont Spinal N KRUNAL      Name: Florencia      Apgar1:  7                Apgar5: 9   3 Term 04 40w0d 06:00 3.572 kg (7 lb 14 oz) M    KRUNAL      Name: Bridger   2  02 36w0d 01:00 2.948 kg (6 lb 8 oz) F IVD   KRUNAL      Name: Brittany   1 1995 5w0d    SAB             ROS:  C: NEGATIVE for fever, chills, change in weight  I: NEGATIVE for worrisome rashes, moles or lesions  E: NEGATIVE for vision changes or irritation  ENT: NEGATIVE for ear, mouth and throat problems  R: NEGATIVE for significant cough or SOB  B: NEGATIVE for masses, tenderness or discharge  CV: NEGATIVE for chest pain, palpitations or peripheral edema  GI: NEGATIVE for nausea, abdominal pain, heartburn, or change in bowel habits  : NEGATIVE for unusual urinary or vaginal symptoms. Periods are regular.  M: NEGATIVE for significant arthralgias or myalgia  N: NEGATIVE for weakness, dizziness or paresthesias  E: NEGATIVE for temperature intolerance, skin/hair changes  H: NEGATIVE for bleeding problems  P: NEGATIVE for changes in mood or affect    Problem list, Medication list, Allergies, and Medical/Social/Surgical histories reviewed in [facility] and updated as appropriate.  BP Readings from Last 3 Encounters:   17 106/70   17 99/64   02/10/17 112/65    Wt Readings from Last 3 Encounters:   17 59.9 kg (132 lb)   17 62.1 kg (137 lb)   17 62.1 kg (136 lb 14.5 oz)                  Patient Active Problem List   Diagnosis     Hair loss      "CARDIOVASCULAR SCREENING; LDL GOAL LESS THAN 160     Influenza A     CAP (community acquired pneumonia)     Past Surgical History:   Procedure Laterality Date     SURGICAL HISTORY OF -       eye surgery age 2       Social History   Substance Use Topics     Smoking status: Never Smoker     Smokeless tobacco: Never Used     Alcohol use No      Comment: rarely/quit with pregnancy     Family History   Problem Relation Age of Onset     Circulatory Mother      DVT OF THE LEG     CEREBROVASCULAR DISEASE Maternal Grandfather      CANCER Paternal Grandmother      age 55, Breast Ca     Depression Paternal Grandmother      GASTROINTESTINAL DISEASE Father      ulcerative colitis         Current Outpatient Prescriptions   Medication Sig Dispense Refill     cefdinir (OMNICEF) 300 MG capsule Take 1 capsule (300 mg) by mouth 2 times daily (Patient not taking: Reported on 6/1/2017) 14 capsule 0     Allergies   Allergen Reactions     Amoxicillin Rash     Trimethoprim Sulfate Other (See Comments)     Vomiting blood after taking medication.     Doxycycline Rash     OBJECTIVE:     /70 (BP Location: Right arm, Patient Position: Chair, Cuff Size: Adult Small)  Pulse 79  Ht 5' 2\" (1.575 m)  Wt 132 lb (59.9 kg)  LMP 05/17/2017  BMI 24.14 kg/m2  EXAM:  GENERAL: healthy, alert and no distress  EYES: Eyes grossly normal to inspection, PERRL and conjunctivae and sclerae normal  HENT: ear canals and TM's normal, nose and mouth without ulcers or lesions  NECK: no adenopathy, no asymmetry, masses, or scars and thyroid normal to palpation  RESP: lungs clear to auscultation - no rales, rhonchi or wheezes  BREAST: normal without masses, tenderness or nipple discharge and no palpable axillary masses or adenopathy  CV: regular rate and rhythm, normal S1 S2, no S3 or S4, no murmur, click or rub, no peripheral edema and peripheral pulses strong  ABDOMEN: soft, nontender, no hepatosplenomegaly, no masses and bowel sounds normal   (female): " "normal female external genitalia, normal urethral meatus, vaginal mucosa pink, moist, well rugated, and normal cervix/adnexa/uterus without masses or discharge  MS: no gross musculoskeletal defects noted, no edema  SKIN: no suspicious lesions or rashes  NEURO: Normal strength and tone, mentation intact and speech normal  PSYCH: mentation appears normal, affect normal/bright    ASSESSMENT/PLAN:     1. Routine general medical examination at a health care facility  Normal exam  - Pap imaged thin layer screen with HPV - recommended age 30 - 65 years (select HPV order below)  - HPV High Risk Types DNA Cervical    COUNSELING:   Reviewed preventive health counseling, as reflected in patient instructions       Regular exercise       Healthy diet/nutrition       Contraception         reports that she has never smoked. She has never used smokeless tobacco.    Estimated body mass index is 24.14 kg/(m^2) as calculated from the following:    Height as of this encounter: 5' 2\" (1.575 m).    Weight as of this encounter: 132 lb (59.9 kg).       Counseling Resources:  ATP IV Guidelines  Pooled Cohorts Equation Calculator  Breast Cancer Risk Calculator  FRAX Risk Assessment  ICSI Preventive Guidelines  Dietary Guidelines for Americans, 2010  [employer]'s MyPlate  ASA Prophylaxis  Lung CA Screening    David Gayle MD  [facility]  "

## 2017-06-01 NOTE — MR AVS SNAPSHOT
After Visit Summary   6/1/2017    Leila Azul    MRN: [id number]           Patient Information     Date Of Birth          1975        Visit Information        Provider Department      6/1/2017 10:30 AM David Gayle MD [facility]        Today's Diagnoses     Routine general medical examination at a health care facility    -  1      Care Instructions      Preventive Health Recommendations  Female Ages 40 to 49    Yearly exam:     See your health care provider every year in order to  1. Review health changes.   2. Discuss preventive care.    3. Review your medicines if your doctor prescribed any.      Get a Pap test every three years (unless you have an abnormal result and your provider advises testing more often).      If you get Pap tests with HPV test, you only need to test every 5 years, unless you have an abnormal result. You do not need a Pap test if your uterus was removed (hysterectomy) and you have not had cancer.      You should be tested each year for STDs (sexually transmitted diseases), if you're at risk.       Ask your doctor if you should have a mammogram.      Have a colonoscopy (test for colon cancer) if someone in your family has had colon cancer or polyps before age 50.       Have a cholesterol test every 5 years.       Have a diabetes test (fasting glucose) after age 45. If you are at risk for diabetes, you should have this test every 3 years.    Shots: Get a flu shot each year. Get a tetanus shot every 10 years.     Nutrition:     Eat at least 5 servings of fruits and vegetables each day.    Eat whole-grain bread, whole-wheat pasta and brown rice instead of white grains and rice.    Talk to your provider about Calcium and Vitamin D.     Lifestyle    Exercise at least 150 minutes a week (an average of 30 minutes a day, 5 days a week). This will help you control your weight and prevent disease.    Limit alcohol to one drink per day.    No smoking.  "    Wear sunscreen to prevent skin cancer.    See your dentist every six months for an exam and cleaning.          Follow-ups after your visit        Who to contact     If you have questions or need follow up information about today's clinic visit or your schedule please contact [facility] directly at [phone].  Normal or non-critical lab and imaging results will be communicated to you by MyChart, letter or phone within 4 business days after the clinic has received the results. If you do not hear from us within 7 days, please contact the clinic through [employer] or phone. If you have a critical or abnormal lab result, we will notify you by phone as soon as possible.  Submit refill requests through [employer] or call your pharmacy and they will forward the refill request to us. Please allow 3 business days for your refill to be completed.          Additional Information About Your Visit        MyChart Information     [employer] gives you secure access to your electronic health record. If you see a primary care provider, you can also send messages to your care team and make appointments. If you have questions, please call your primary care clinic.  If you do not have a primary care provider, please call [phone] and they will assist you.        Care EveryWhere ID     This is your Care EveryWhere ID. This could be used by other organizations to access your [place] medical records  [id number]        Your Vitals Were     Pulse Height Last Period BMI (Body Mass Index)          79 1.575 m (5' 2\") 05/17/2017 24.14 kg/m2         Blood Pressure from Last 3 Encounters:   06/01/17 106/70   02/17/17 99/64   02/10/17 112/65    Weight from Last 3 Encounters:   06/01/17 59.9 kg (132 lb)   02/17/17 62.1 kg (137 lb)   02/07/17 62.1 kg (136 lb 14.5 oz)              We Performed the Following     HPV High Risk Types DNA Cervical     Pap imaged thin layer screen with HPV - recommended age 30 - 65 years (select HPV " order below)        Primary Care Provider Office Phone # Fax #    Lili Tyson [phone]       [facility] [address]  [facility] [address]        Thank you!     Thank you for choosing [facility]  for your care. Our goal is always to provide you with excellent care. Hearing back from our patients is one way we can continue to improve our services. Please take a few minutes to complete the written survey that you may receive in the mail after your visit with us. Thank you!             Your Updated Medication List - Protect others around you: Learn how to safely use, store and throw away your medicines at www.disposemymeds.org.          This list is accurate as of: 6/1/17 11:59 PM.  Always use your most recent med list.                   Brand Name Dispense Instructions for use    cefdinir 300 MG capsule    OMNICEF    14 capsule    Take 1 capsule (300 mg) by mouth 2 times daily

## 2017-06-02 ENCOUNTER — HOSPITAL ENCOUNTER (OUTPATIENT)
Dept: MAMMOGRAPHY | Facility: CLINIC | Age: 42
Discharge: HOME | End: 2017-06-02
Attending: OBSTETRICS & GYNECOLOGY | Admitting: OBSTETRICS & GYNECOLOGY
Payer: COMMERCIAL

## 2017-06-02 ENCOUNTER — HOSPITAL ENCOUNTER (OUTPATIENT)
Dept: ULTRASOUND IMAGING | Facility: CLINIC | Age: 42
End: 2017-06-02
Attending: OBSTETRICS & GYNECOLOGY
Payer: COMMERCIAL

## 2017-06-02 DIAGNOSIS — Z00.00 ROUTINE GENERAL MEDICAL EXAMINATION AT A HEALTH CARE FACILITY: ICD-10-CM

## 2017-06-02 DIAGNOSIS — N63.10 LUMP OF RIGHT BREAST: ICD-10-CM

## 2017-06-02 PROCEDURE — G0204 DX MAMMO INCL CAD BI: HCPCS

## 2017-06-02 PROCEDURE — 76642 ULTRASOUND BREAST LIMITED: CPT | Mod: RT

## 2017-06-02 NOTE — LETTER
[facility] Ultrasound  [address]  [place] [address]                                                                                                              Leila Azul  [address] [place] [address]    June 2, 2017  Date of Exam: 6/2/17    Dear Leila:    Thank you for your recent visit.  Breast Imaging Result: We are pleased to inform you that the results of your recent breast imaging show no evidence of malignancy (cancer).    Breast Density: Your mammogram shows that you have dense breast tissue. This means you have a slightly higher risk of getting breast cancer. It also means your mammograms will be harder to read but it doesn't mean that mammograms aren t useful. In fact, yearly mammograms are even more important for women at higher risk.    If you are experiencing any breast problems such as a lump or localized pain we request that you discuss this with your health care provider if you haven t already done so, as additional testing may be necessary.    As you know, early detection of cancer is very important. Although mammography is the most accurate method for early detection, not all cancers are found through mammography. A thorough examination includes a combination of mammography, physical examination and breast self-examination. Currently the American College of Radiology, Society of Breast Imaging and American College of Obstetricians and Gynecologists recommend an annual mammogram for all women beginning at the age of 40.    A report of your breast imaging results was sent to: David Gayle    Your breast imaging will become part of your medical file here at [facility] for at least 10 years. You are responsible for informing any new health care provider or breast imaging facility of the date and location of this examination.    We appreciate the opportunity to participate in your health care.    Sincerely,    Jalil Yang MD  Interpreting  Radiologist  [facility]

## 2017-06-05 LAB
COPATH REPORT: NORMAL
PAP: NORMAL

## 2017-06-07 LAB
FINAL DIAGNOSIS: NORMAL
HPV HR 12 DNA CVX QL NAA+PROBE: NEGATIVE
HPV16 DNA SPEC QL NAA+PROBE: NEGATIVE
HPV18 DNA SPEC QL NAA+PROBE: NEGATIVE
SPECIMEN DESCRIPTION: NORMAL

## 2017-08-16 ENCOUNTER — TELEPHONE (OUTPATIENT)
Dept: FAMILY MEDICINE | Facility: CLINIC | Age: 42
End: 2017-08-16

## 2017-08-16 ENCOUNTER — OFFICE VISIT (OUTPATIENT)
Dept: URGENT CARE | Facility: URGENT CARE | Age: 42
End: 2017-08-16
Payer: COMMERCIAL

## 2017-08-16 ENCOUNTER — RADIANT APPOINTMENT (OUTPATIENT)
Dept: GENERAL RADIOLOGY | Facility: CLINIC | Age: 42
End: 2017-08-16
Attending: NURSE PRACTITIONER
Payer: COMMERCIAL

## 2017-08-16 VITALS
WEIGHT: 134.4 LBS | SYSTOLIC BLOOD PRESSURE: 114 MMHG | BODY MASS INDEX: 24.58 KG/M2 | DIASTOLIC BLOOD PRESSURE: 66 MMHG | HEART RATE: 77 BPM | OXYGEN SATURATION: 100 % | TEMPERATURE: 97.4 F

## 2017-08-16 DIAGNOSIS — R07.89 CHEST DISCOMFORT: ICD-10-CM

## 2017-08-16 DIAGNOSIS — R07.89 CHEST DISCOMFORT: Primary | ICD-10-CM

## 2017-08-16 LAB
BASOPHILS # BLD AUTO: 0 10E9/L (ref 0–0.2)
BASOPHILS NFR BLD AUTO: 0.5 %
DIFFERENTIAL METHOD BLD: NORMAL
EOSINOPHIL # BLD AUTO: 0.2 10E9/L (ref 0–0.7)
EOSINOPHIL NFR BLD AUTO: 2.9 %
ERYTHROCYTE [DISTWIDTH] IN BLOOD BY AUTOMATED COUNT: 12.8 % (ref 10–15)
HCT VFR BLD AUTO: 41 % (ref 35–47)
HGB BLD-MCNC: 13.7 G/DL (ref 11.7–15.7)
LYMPHOCYTES # BLD AUTO: 1.3 10E9/L (ref 0.8–5.3)
LYMPHOCYTES NFR BLD AUTO: 21.2 %
MCH RBC QN AUTO: 30.2 PG (ref 26.5–33)
MCHC RBC AUTO-ENTMCNC: 33.4 G/DL (ref 31.5–36.5)
MCV RBC AUTO: 90 FL (ref 78–100)
MONOCYTES # BLD AUTO: 0.6 10E9/L (ref 0–1.3)
MONOCYTES NFR BLD AUTO: 10.1 %
NEUTROPHILS # BLD AUTO: 4 10E9/L (ref 1.6–8.3)
NEUTROPHILS NFR BLD AUTO: 65.3 %
PLATELET # BLD AUTO: 268 10E9/L (ref 150–450)
RBC # BLD AUTO: 4.54 10E12/L (ref 3.8–5.2)
WBC # BLD AUTO: 6.1 10E9/L (ref 4–11)

## 2017-08-16 PROCEDURE — 85025 COMPLETE CBC W/AUTO DIFF WBC: CPT | Performed by: NURSE PRACTITIONER

## 2017-08-16 PROCEDURE — 93000 ELECTROCARDIOGRAM COMPLETE: CPT | Performed by: NURSE PRACTITIONER

## 2017-08-16 PROCEDURE — 99213 OFFICE O/P EST LOW 20 MIN: CPT | Performed by: NURSE PRACTITIONER

## 2017-08-16 PROCEDURE — 36415 COLL VENOUS BLD VENIPUNCTURE: CPT | Performed by: NURSE PRACTITIONER

## 2017-08-16 PROCEDURE — 71020 XR CHEST 2 VW: CPT

## 2017-08-16 NOTE — PROGRESS NOTES
SUBJECTIVE:                                                    Leila Azul is a 41 year old female who presents to clinic today for the following health issues:      Chief Complaint   Patient presents with     Headache     yesterday just standing and all of a sudden had pressure(sting) in chest- still there today but was stronger then, went septic in February, pressure headache, feels heart beat in head, neck is stiff, muscles ache            Problem list and histories reviewed & adjusted, as indicated.  Additional history: as documented    Patient Active Problem List   Diagnosis     Hair loss     CARDIOVASCULAR SCREENING; LDL GOAL LESS THAN 160     Influenza A     CAP (community acquired pneumonia)     Past Surgical History:   Procedure Laterality Date     SURGICAL HISTORY OF -       eye surgery age 2       Social History   Substance Use Topics     Smoking status: Never Smoker     Smokeless tobacco: Never Used     Alcohol use No      Comment: rarely/quit with pregnancy     Family History   Problem Relation Age of Onset     Circulatory Mother      DVT OF THE LEG     CEREBROVASCULAR DISEASE Maternal Grandfather      CANCER Paternal Grandmother      age 55, Breast Ca     Depression Paternal Grandmother      GASTROINTESTINAL DISEASE Father      ulcerative colitis         No current outpatient prescriptions on file.     Allergies   Allergen Reactions     Amoxicillin Rash     Trimethoprim Sulfate Other (See Comments)     Vomiting blood after taking medication.     Doxycycline Rash     Labs reviewed in EPIC      Reviewed and updated as needed this visit by clinical staffTobacco  Allergies  Meds  Problems  Med Hx  Surg Hx  Fam Hx  Soc Hx        Reviewed and updated as needed this visit by Provider  Allergies  Meds  Problems         ROS:  Constitutional, HEENT, cardiovascular, pulmonary, GI, , musculoskeletal, neuro, skin, endocrine and psych systems are negative, except as otherwise  noted.      OBJECTIVE:   /66  Pulse 77  Temp 97.4  F (36.3  C) (Tympanic)  Wt 134 lb 6.4 oz (61 kg)  SpO2 100%  BMI 24.58 kg/m2  Body mass index is 24.58 kg/(m^2).   GENERAL: healthy, alert and no distress, nontoxic in appearance  EYES: Eyes grossly normal to inspection, PERRL and conjunctivae and sclerae normal  HENT: ear canals and TM's normal, nose and mouth without ulcers or lesions  NECK: no adenopathy,supple with full ROM  RESP: lungs clear to auscultation - no rales, rhonchi or wheezes  CV: regular rate and rhythm, normal S1 S2, no S3 or S4, no murmur, click or rub, no peripheral edema   ABDOMEN: soft, nontender, no hepatosplenomegaly, no masses and bowel sounds normal  MS: no gross musculoskeletal defects noted, no edema  No rash    Diagnostic Test Results:CHEST 2 VIEWS   8/16/2017 6:05 PM      HISTORY: Chest pain.     COMPARISON: 2/8/2017.     FINDINGS: The lungs are clear. Normal-sized cardiac silhouette.         IMPRESSION: No evidence of active cardiopulmonary disease.     MICHELLE MAE MD  Results for orders placed or performed in visit on 08/16/17 (from the past 24 hour(s))   CBC with platelets and differential   Result Value Ref Range    WBC 6.1 4.0 - 11.0 10e9/L    RBC Count 4.54 3.8 - 5.2 10e12/L    Hemoglobin 13.7 11.7 - 15.7 g/dL    Hematocrit 41.0 35.0 - 47.0 %    MCV 90 78 - 100 fl    MCH 30.2 26.5 - 33.0 pg    MCHC 33.4 31.5 - 36.5 g/dL    RDW 12.8 10.0 - 15.0 %    Platelet Count 268 150 - 450 10e9/L    Diff Method Automated Method     % Neutrophils 65.3 %    % Lymphocytes 21.2 %    % Monocytes 10.1 %    % Eosinophils 2.9 %    % Basophils 0.5 %    Absolute Neutrophil 4.0 1.6 - 8.3 10e9/L    Absolute Lymphocytes 1.3 0.8 - 5.3 10e9/L    Absolute Monocytes 0.6 0.0 - 1.3 10e9/L    Absolute Eosinophils 0.2 0.0 - 0.7 10e9/L    Absolute Basophils 0.0 0.0 - 0.2 10e9/L       ASSESSMENT/PLAN:     Problem List Items Addressed This Visit     None      Visit Diagnoses     Chest discomfort    -   Primary    Relevant Orders    XR Chest 2 Views (Completed)    CBC with platelets and differential (Completed)    EKG 12-lead complete w/read - Clinics (Completed)               Patient Instructions   Increase rest and fluids. Tylenol and/or Ibuprofen for comfort.     Watchful waiting.   If your symptoms worsen or do not resolve follow up with your primary care provider in 1 week and sooner if needed.        Indications for emergent return to emergency department discussed with patient, who verbalized good understanding and agreement.  Patient understands the limitations of today's evaluation.           *CHEST PAIN, NONCARDIAC    Based on your visit today, the exact cause of your chest pain is not certain. Your condition does not seem serious and your pain does not appear to be coming from your heart. However, sometimes the signs of a serious problem take more time to appear. Therefore, please watch for the warning signs listed below.  HOME CARE:  1. Rest today and avoid strenuous activity.  2. Take any prescribed medicine as directed.  FOLLOW UP with your doctor in 1-3 days.   GET PROMPT MEDICAL ATTENTION if any of the following occur:    A change in the type of pain: if it feels different, becomes more severe, lasts longer, or begins to spread into your shoulder, arm, neck, jaw or back    Shortness of breath or increased pain with breathing    Cough with blood or dark colored sputum (phlegm)    Weakness, dizziness, or fainting    Fever over 101  F (38.3  C)    Swelling, pain or redness in one leg    [address] [place] [address]. All rights reserved. This information is not intended as a substitute for professional medical care. Always follow your healthcare professional's instructions.      CHEST 2 VIEWS   8/16/2017 6:05 PM      HISTORY: Chest pain.     COMPARISON: 2/8/2017.     FINDINGS: The lungs are clear. Normal-sized cardiac silhouette.         IMPRESSION: No evidence of  active cardiopulmonary disease.     MD Anabella WEINER APRN [facility] URGENT CARE

## 2017-08-16 NOTE — TELEPHONE ENCOUNTER
Reason for call:  Patient reporting a symptom    Symptom or request: Chest pain since yesterday - Pt is not SOB, but is nauseated.      Duration (how long have symptoms been present): 2 days    Have you been treated for this before? Yes    Additional comments:     Phone Number patient can be reached at:  Home number on file [phone] (home)    Best Time:  any    Can we leave a detailed message on this number:  YES    Call taken on 8/16/2017 at 1:56 PM by Citlaly Meyers

## 2017-08-16 NOTE — TELEPHONE ENCOUNTER
"Leila Azul is a 41 year old female who calls with chest pain.     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  She states then she could feel this going up the back of her neck to her head and then had a HA.  She states the pain that went to the back of her neck is separate from what she is feeling in her chest.  She describes her HA of a pressure HA that is unrelieved by acetaminophen.  Patient also states that her neck is stiff/achey when touching chin to chest.  Also stating that her body feels achey.    NURSING ASSESSMENT:  Patient complains of chest pain - described as above.  Onset:  yesterday  Pain is characterized as \"feels like your kids are dig elbows into chest\"   Severity localized  Located mid chest - to the right.   Radiates to: Patient states the pain that went to the back of her neck is separate from what she is feeling in her chest.  Duration constant - although today feels less of a pain than yesterday.  Associated symptoms nausea, lightheadedness and feeling faint.  Exacerbated by: HA - is worsened by movement, can feel heartbeat in her head.  Chest pain - does not change regardless of rest or activity.  Relieved by nothing.  Cardiac risk factors: none.  Associated Medical History: N/A  Allergies:   Allergies   Allergen Reactions     Amoxicillin Rash     Trimethoprim Sulfate Other (See Comments)     Vomiting blood after taking medication.     Doxycycline Rash       MEDICATIONS:  Taking medication(s) as prescribed? Yes  Taking over the counter medication(s) ?Yes  Any medication side effects? No significant side effects    Any barriers to taking medication(s) as prescribed?  No  Medication(s) improving/managing symptoms?  No  Medication reconciliation completed: Yes    Last exam/Treatment:      NURSING PLAN: Nursing advice to patient go " to ER for further evaluation due to stiff/achey neck and associated symptoms.    RECOMMENDED DISPOSITION:  To ED, another person to drive  Will comply with recommendation: No - Patient declined RN recommendation per protocol.  She states she would like to wait and see how she feels tomorrow.  If further questions/concerns or if symptoms do not improve, worsen or new symptoms develop, call your PCP or [facility] as soon as possible.      Guideline used:  Telephone Triage Protocols for Nurses, Fifth Edition, Vickie Maravilla RN

## 2017-08-17 NOTE — PATIENT INSTRUCTIONS
Increase rest and fluids. Tylenol and/or Ibuprofen for comfort.     Watchful waiting.   If your symptoms worsen or do not resolve follow up with your primary care provider in 1 week and sooner if needed.        Indications for emergent return to emergency department discussed with patient, who verbalized good understanding and agreement.  Patient understands the limitations of today's evaluation.           *CHEST PAIN, NONCARDIAC    Based on your visit today, the exact cause of your chest pain is not certain. Your condition does not seem serious and your pain does not appear to be coming from your heart. However, sometimes the signs of a serious problem take more time to appear. Therefore, please watch for the warning signs listed below.  HOME CARE:  1. Rest today and avoid strenuous activity.  2. Take any prescribed medicine as directed.  FOLLOW UP with your doctor in 1-3 days.   GET PROMPT MEDICAL ATTENTION if any of the following occur:    A change in the type of pain: if it feels different, becomes more severe, lasts longer, or begins to spread into your shoulder, arm, neck, jaw or back    Shortness of breath or increased pain with breathing    Cough with blood or dark colored sputum (phlegm)    Weakness, dizziness, or fainting    Fever over 101  F (38.3  C)    Swelling, pain or redness in one leg    [address] [place] [address]. All rights reserved. This information is not intended as a substitute for professional medical care. Always follow your healthcare professional's instructions.      CHEST 2 VIEWS   8/16/2017 6:05 PM      HISTORY: Chest pain.     COMPARISON: 2/8/2017.     FINDINGS: The lungs are clear. Normal-sized cardiac silhouette.         IMPRESSION: No evidence of active cardiopulmonary disease.     MICHELLE MAE MD

## 2018-06-29 ENCOUNTER — OFFICE VISIT (OUTPATIENT)
Dept: FAMILY MEDICINE | Facility: CLINIC | Age: 43
End: 2018-06-29
Payer: COMMERCIAL

## 2018-06-29 VITALS
BODY MASS INDEX: 23.96 KG/M2 | SYSTOLIC BLOOD PRESSURE: 97 MMHG | HEIGHT: 62 IN | TEMPERATURE: 98.6 F | HEART RATE: 78 BPM | WEIGHT: 130.2 LBS | RESPIRATION RATE: 16 BRPM | OXYGEN SATURATION: 98 % | DIASTOLIC BLOOD PRESSURE: 66 MMHG

## 2018-06-29 DIAGNOSIS — R00.2 PALPITATIONS: ICD-10-CM

## 2018-06-29 DIAGNOSIS — R10.9 ABDOMINAL CRAMPING: Primary | ICD-10-CM

## 2018-06-29 LAB
ALBUMIN SERPL-MCNC: 4 G/DL (ref 3.4–5)
ALBUMIN UR-MCNC: NEGATIVE MG/DL
ALP SERPL-CCNC: 51 U/L (ref 40–150)
ALT SERPL W P-5'-P-CCNC: 24 U/L (ref 0–50)
ANION GAP SERPL CALCULATED.3IONS-SCNC: 5 MMOL/L (ref 3–14)
APPEARANCE UR: CLEAR
AST SERPL W P-5'-P-CCNC: 25 U/L (ref 0–45)
BACTERIA #/AREA URNS HPF: ABNORMAL /HPF
BASOPHILS # BLD AUTO: 0 10E9/L (ref 0–0.2)
BASOPHILS NFR BLD AUTO: 0.4 %
BILIRUB SERPL-MCNC: 0.6 MG/DL (ref 0.2–1.3)
BILIRUB UR QL STRIP: NEGATIVE
BUN SERPL-MCNC: 10 MG/DL (ref 7–30)
CALCIUM SERPL-MCNC: 8.6 MG/DL (ref 8.5–10.1)
CHLORIDE SERPL-SCNC: 106 MMOL/L (ref 94–109)
CO2 SERPL-SCNC: 29 MMOL/L (ref 20–32)
COLOR UR AUTO: YELLOW
CREAT SERPL-MCNC: 0.76 MG/DL (ref 0.52–1.04)
DIFFERENTIAL METHOD BLD: NORMAL
EOSINOPHIL # BLD AUTO: 0.1 10E9/L (ref 0–0.7)
EOSINOPHIL NFR BLD AUTO: 2.8 %
ERYTHROCYTE [DISTWIDTH] IN BLOOD BY AUTOMATED COUNT: 13 % (ref 10–15)
GFR SERPL CREATININE-BSD FRML MDRD: 83 ML/MIN/1.7M2
GLUCOSE SERPL-MCNC: 79 MG/DL (ref 70–99)
GLUCOSE UR STRIP-MCNC: NEGATIVE MG/DL
HCT VFR BLD AUTO: 41.7 % (ref 35–47)
HGB BLD-MCNC: 13.7 G/DL (ref 11.7–15.7)
HGB UR QL STRIP: ABNORMAL
KETONES UR STRIP-MCNC: NEGATIVE MG/DL
LEUKOCYTE ESTERASE UR QL STRIP: NEGATIVE
LYMPHOCYTES # BLD AUTO: 1.2 10E9/L (ref 0.8–5.3)
LYMPHOCYTES NFR BLD AUTO: 24.5 %
MAGNESIUM SERPL-MCNC: 2.3 MG/DL (ref 1.6–2.3)
MCH RBC QN AUTO: 29.7 PG (ref 26.5–33)
MCHC RBC AUTO-ENTMCNC: 32.9 G/DL (ref 31.5–36.5)
MCV RBC AUTO: 91 FL (ref 78–100)
MONOCYTES # BLD AUTO: 0.6 10E9/L (ref 0–1.3)
MONOCYTES NFR BLD AUTO: 11.2 %
NEUTROPHILS # BLD AUTO: 3 10E9/L (ref 1.6–8.3)
NEUTROPHILS NFR BLD AUTO: 61.1 %
NITRATE UR QL: NEGATIVE
NON-SQ EPI CELLS #/AREA URNS LPF: ABNORMAL /LPF
PH UR STRIP: 6 PH (ref 5–7)
PLATELET # BLD AUTO: 257 10E9/L (ref 150–450)
POTASSIUM SERPL-SCNC: 4.2 MMOL/L (ref 3.4–5.3)
PROT SERPL-MCNC: 7.4 G/DL (ref 6.8–8.8)
RBC # BLD AUTO: 4.61 10E12/L (ref 3.8–5.2)
RBC #/AREA URNS AUTO: ABNORMAL /HPF
SODIUM SERPL-SCNC: 140 MMOL/L (ref 133–144)
SOURCE: ABNORMAL
SP GR UR STRIP: <=1.005 (ref 1–1.03)
TSH SERPL DL<=0.005 MIU/L-ACNC: 2.61 MU/L (ref 0.4–4)
UROBILINOGEN UR STRIP-ACNC: 0.2 EU/DL (ref 0.2–1)
WBC # BLD AUTO: 4.9 10E9/L (ref 4–11)
WBC #/AREA URNS AUTO: ABNORMAL /HPF

## 2018-06-29 PROCEDURE — 99000 SPECIMEN HANDLING OFFICE-LAB: CPT | Performed by: FAMILY MEDICINE

## 2018-06-29 PROCEDURE — 93000 ELECTROCARDIOGRAM COMPLETE: CPT | Performed by: FAMILY MEDICINE

## 2018-06-29 PROCEDURE — 83735 ASSAY OF MAGNESIUM: CPT | Performed by: FAMILY MEDICINE

## 2018-06-29 PROCEDURE — 36415 COLL VENOUS BLD VENIPUNCTURE: CPT | Performed by: FAMILY MEDICINE

## 2018-06-29 PROCEDURE — 86618 LYME DISEASE ANTIBODY: CPT | Performed by: FAMILY MEDICINE

## 2018-06-29 PROCEDURE — 86617 LYME DISEASE ANTIBODY: CPT | Mod: 90 | Performed by: FAMILY MEDICINE

## 2018-06-29 PROCEDURE — 99214 OFFICE O/P EST MOD 30 MIN: CPT | Performed by: FAMILY MEDICINE

## 2018-06-29 PROCEDURE — 80053 COMPREHEN METABOLIC PANEL: CPT | Performed by: FAMILY MEDICINE

## 2018-06-29 PROCEDURE — 81001 URINALYSIS AUTO W/SCOPE: CPT | Performed by: FAMILY MEDICINE

## 2018-06-29 PROCEDURE — 84443 ASSAY THYROID STIM HORMONE: CPT | Performed by: FAMILY MEDICINE

## 2018-06-29 PROCEDURE — 85025 COMPLETE CBC W/AUTO DIFF WBC: CPT | Performed by: FAMILY MEDICINE

## 2018-06-29 ASSESSMENT — PAIN SCALES - GENERAL: PAINLEVEL: NO PAIN (0)

## 2018-06-29 NOTE — MR AVS SNAPSHOT
After Visit Summary   6/29/2018    Leila Azul    MRN: [id number]           Patient Information     Date Of Birth          1975        Visit Information        Provider Department      6/29/2018 7:20 AM Ashish Kelly MD [facility]        Today's Diagnoses     Abdominal cramping    -  1    Palpitations          Care Instructions          Thank you for choosing [facility].  You may be receiving a survey in the mail from [facility] regarding your visit today.  Please take a few minutes to complete and return the survey to let us know how we are doing.      Our Clinic hours are:  Mondays    7:20 am - 7 pm  Tues -  Fri  7:20 am - 5 pm    Clinic Phone: [phone]    The clinic lab opens at 7:30 am Mon - Fri and appointments are required.    [facility]  Ph. [phone]  Monday  8 am - 7pm  Tues - Fri 8 am - 5:30 pm                 Follow-ups after your visit        Your next 10 appointments already scheduled     Jun 29, 2018  1:30 PM CDT   Holter Monitor with [facility]   [facility] ([facility])    [address] [address]   [phone]              Future tests that were ordered for you today     Open Future Orders        Priority Expected Expires Ordered    Holter Monitor 24 hour - Adult Routine  8/13/2018 6/29/2018            Who to contact     If you have questions or need follow up information about today's clinic visit or your schedule please contact [facility] directly at [phone].  Normal or non-critical lab and imaging results will be communicated to you by MyChart, letter or phone within 4 business days after the clinic has received the results. If you do not hear from us within 7 days, please contact the clinic through MyChart or phone. If you have a critical or abnormal lab result, we will notify you by phone as soon as possible.  Submit  "refill requests through [employer] or call your pharmacy and they will forward the refill request to us. Please allow 3 business days for your refill to be completed.          Additional Information About Your Visit        [facility] Information     [employer] gives you secure access to your electronic health record. If you see a primary care provider, you can also send messages to your care team and make appointments. If you have questions, please call your primary care clinic.  If you do not have a primary care provider, please call [phone] and they will assist you.        Care EveryWhere ID     This is your Care EveryWhere ID. This could be used by other organizations to access your [facility] medical records  [id number]        Your Vitals Were     Pulse Temperature Respirations Height Last Period Pulse Oximetry    78 98.6  F (37  C) (Tympanic) 16 5' 2\" (1.575 m) 06/20/2018 98%    Breastfeeding? BMI (Body Mass Index)                No 23.81 kg/m2           Blood Pressure from Last 3 Encounters:   06/29/18 97/66   08/16/17 114/66   06/01/17 106/70    Weight from Last 3 Encounters:   06/29/18 130 lb 3.2 oz (59.1 kg)   08/16/17 134 lb 6.4 oz (61 kg)   06/01/17 132 lb (59.9 kg)              We Performed the Following     CBC with platelets differential     Comprehensive metabolic panel     EKG 12-lead complete w/read - Clinics     Lyme Disease Tessy with reflex to WB Serum     Magnesium     TSH with free T4 reflex     UA reflex to Microscopic and Culture     Urine Microscopic        Primary Care Provider Office Phone # Fax #    Lili Tyson [phone] [address] [facility] [address]        Equal Access to Services     [facility]: Hadii zayda Yanes, waaxda luqadaha, qaybta kaalmada ann-marie garcia. So [facility] [phone].    ATENCIÓN: Si habla español, tiene a boothe disposición servicios gratuitos de asistencia lingüística. Llame al " [phone].    We comply with applicable federal civil rights laws and [place] laws. We do not discriminate on the basis of race, color, national origin, age, disability, sex, sexual orientation, or gender identity.            Thank you!     Thank you for choosing [facility]  for your care. Our goal is always to provide you with excellent care. Hearing back from our patients is one way we can continue to improve our services. Please take a few minutes to complete the written survey that you may receive in the mail after your visit with us. Thank you!             Your Updated Medication List - Protect others around you: Learn how to safely use, store and throw away your medicines at www.disposemymeds.org.      Notice  As of 6/29/2018  8:14 AM    You have not been prescribed any medications.

## 2018-06-29 NOTE — PROGRESS NOTES
SUBJECTIVE:   Leila Azul is a 42 year old female who presents to clinic today for the following health issues:      Problem:   Chief Complaint   Patient presents with     Fatigue     , weakness, per wrist heart monitor irregular heart rate ranging from  33 - 200  x 1 week. Muscles feel weak and achy.     UTI     discomfort during urination, right kidney pain, abdominal cramping.  sx's started 18. No hx of kidney stones     ADDITIONAL HPI: 42 year old female here for above issue.  mgf - had some sort of heart disease that he  of in his 50's    ROS: 10 point review of systems negative except as per HPI.    PAST MEDICAL HISTORY:  Past Medical History:   Diagnosis Date     Anemia      increased blood loss after 1st delivery     Chickenpox       labor ,,    -bedrest/terbutaline wiith 1st two preg.        ACTIVE MEDICAL PROBLEMS:  Patient Active Problem List   Diagnosis     Hair loss     CARDIOVASCULAR SCREENING; LDL GOAL LESS THAN 160     Influenza A     CAP (community acquired pneumonia)        FAMILY HISTORY:  Family History   Problem Relation Age of Onset     Circulatory Mother      DVT OF THE LEG     Cerebrovascular Disease Maternal Grandfather      Cancer Paternal Grandmother      age 55, Breast Ca     Depression Paternal Grandmother      GASTROINTESTINAL DISEASE Father      ulcerative colitis       SOCIAL HISTORY:  Social History     Social History     Marital status:      Spouse name: Jeevan     Number of children: 3     Years of education: N/A     Occupational History      Other           [employer]     Social History Main Topics     Smoking status: Never Smoker     Smokeless tobacco: Never Used     Alcohol use No      Comment: rarely/quit with pregnancy     Drug use: No     Sexual activity: Yes     Partners: Male     Birth control/ protection: Condom     Other Topics Concern     Parent/Sibling W/ Cabg, Mi Or Angioplasty Before 65f 55m? No  "    Social History Narrative       MEDICATIONS:  No current outpatient prescriptions on file.       ALLERGIES:     Allergies   Allergen Reactions     Amoxicillin Rash     Trimethoprim Sulfate Other (See Comments)     Vomiting blood after taking medication.     Doxycycline Rash       Problem list, Medication list, Allergies, and Medical/Social/Surgical histories reviewed in [facility] and updated as appropriate.    OBJECTIVE:                                                    VITALS: BP 97/66 (BP Location: Right arm, Cuff Size: Adult Regular)  Pulse 78  Temp 98.6  F (37  C) (Tympanic)  Resp 16  Ht 5' 2\" (1.575 m)  Wt 130 lb 3.2 oz (59.1 kg)  LMP 06/20/2018  SpO2 98%  Breastfeeding? No  BMI 23.81 kg/m2 Body mass index is 23.81 kg/(m^2).  GENERAL: Pleasant, well appearing female.  HEENT: PERRL, EOMI, oropharynx clear.  NECK: supple, no thyromegaly or thyroid masses, no lymphadenopathy.  CV: RRR, no murmurs, rubs or gallops.  LUNGS: Clear to auscultation bilaterally, normal effort.  ABDOMEN: Soft, non-distended, non-tender.  No hepatosplenomegaly or palpable masses.    SKIN: warm and dry without obvious rashes.   EXTREMITIES: No edema, normal pulses.     EKG: (read and interpreted by me) NSR. Negative precordial T-waves.No acute ST-T changes.      UA RESULTS:  Recent Labs   Lab Test  06/29/18   0735   COLOR  Yellow   APPEARANCE  Clear   URINEGLC  Negative   URINEBILI  Negative   URINEKETONE  Negative   SG  <=1.005   UBLD  Trace*   URINEPH  6.0   PROTEIN  Negative   UROBILINOGEN  0.2   NITRITE  Negative   LEUKEST  Negative   RBCU  O - 2   WBCU  0 - 5        ASSESSMENT/PLAN:                                                    1. Abdominal cramping  No evidence of UTI. ?IBS.  - UA reflex to Microscopic and Culture  - Urine Microscopic    2. Palpitations  Recommend Holter monitor. She'd prefer to hold off until symptoms become more pronounced again as they have nearly resolved as of today.  Will check additional labs " below. Further work-up and management as dictated by results.   - Lyme Disease Tessy with reflex to WB Serum  - CBC with platelets differential  - Comprehensive metabolic panel  - TSH with free T4 reflex  - Magnesium  - Holter Monitor 24 hour - Adult; Future  - EKG 12-lead complete w/read - Clinics

## 2018-06-29 NOTE — PATIENT INSTRUCTIONS
Thank you for choosing [facility].  You may be receiving a survey in the mail from [facility] regarding your visit today.  Please take a few minutes to complete and return the survey to let us know how we are doing.      Our Clinic hours are:  Mondays    7:20 am - 7 pm  Tues - Fri  7:20 am - 5 pm    Clinic Phone: [phone]    The clinic lab opens at 7:30 am Mon - Fri and appointments are required.    [facility]. [phone]  Monday  8 am - 7pm  Tues - Fri 8 am - 5:30 pm

## 2018-07-01 LAB — B BURGDOR IGG+IGM SER QL: 5.31 (ref 0–0.89)

## 2018-07-03 ENCOUNTER — TELEPHONE (OUTPATIENT)
Dept: FAMILY MEDICINE | Facility: CLINIC | Age: 43
End: 2018-07-03

## 2018-07-03 LAB
B BURGDOR IGG SER QL IB: NEGATIVE
B BURGDOR IGM SER QL IB: NEGATIVE

## 2018-07-03 NOTE — TELEPHONE ENCOUNTER
Patient was informed we are waiting for the confirmation of the western blot.    Jocelyn HOOKS RN

## 2018-07-03 NOTE — TELEPHONE ENCOUNTER
Reason for Call:  Other call back    Detailed comments: Patient states she has + Lymes and wants to know what the next step is.    Phone Number Patient can be reached at: Home number on file [phone] (home)    Best Time: any    Can we leave a detailed message on this number? YES    Call taken on 7/3/2018 at 10:17 AM by Anastasiya Murcia

## 2018-07-11 NOTE — PROGRESS NOTES
Notified via MyChart: Confirmatory testing negative for Lyme - the screening test has a significant false positive potential to not miss Lyme cases. The western blot is significantly more sensitive/accurate.

## 2018-07-16 ENCOUNTER — TELEPHONE (OUTPATIENT)
Dept: FAMILY MEDICINE | Facility: CLINIC | Age: 43
End: 2018-07-16

## 2018-07-16 NOTE — TELEPHONE ENCOUNTER
S-(situation): Patient called and concerned about labs results.  What is the next step?    B-(background): patient had lab tests for lymes.  The first test was positive and the Lyme conf was negative.    A-(assessment): Patient continues to have symptoms.  Patient reports the symptoms have not worsen.  No other symptoms have developed.  Patient continues to have weakness and fatigue.   Advised patient the second test is more sensitive/accurate.    R-(recommendations): Will send to provider to review and advise.  Patient is aware provider is out of the office until 7/18/18.      Pharmacy [facility].      Thank you  Jocelyn HOOKS RN

## 2018-07-16 NOTE — TELEPHONE ENCOUNTER
Reason for Call:  Questions regarding Lymes results    Detailed comments: patient is calling and stating her Lymes Disease test, the number were quite high, but the Western Blot was not. She is wondering if there is something else she should she do? Please advise. Should she be on medication?      Phone Number Patient can be reached at: [phone]    Best Time: any    Can we leave a detailed message on this number? YES       Call taken on 7/16/2018 at 9:26 AM by Isis Keene  [facility]  [facility]

## 2018-07-18 NOTE — TELEPHONE ENCOUNTER
If still having symptoms I would suggest doing the Holter monitor that we discussed.  Given length of symptoms If this were lyme the western blot should be positive (and it's not - so not lyme disease) but we could repeat labs again in 4-6 weeks to confirm this remains negative.

## 2018-07-18 NOTE — TELEPHONE ENCOUNTER
Patient was notified of the recommendations. Patient would like to wait for repeat lab and see what her symptoms do.  Patient will call back in about one month if she would like to have lab repeated.    For the holter monitor- patient is currently not having symptoms.  Patient will wait until those symptoms start again and she will call to schedule that.    Jocelyn HOOKS RN

## 2018-11-29 ENCOUNTER — TELEPHONE (OUTPATIENT)
Dept: FAMILY MEDICINE | Facility: CLINIC | Age: 43
End: 2018-11-29

## 2018-11-29 DIAGNOSIS — R00.2 PALPITATIONS: Primary | ICD-10-CM

## 2018-11-29 NOTE — TELEPHONE ENCOUNTER
Katie from Cardiology is calling and stating that she needs a new order for this patient for a 24 hour Holter Monitor placed. She cannot click into the order you already placed. Hoping you can do this today.  Isis Keene  [facility]  [facility]

## 2018-11-29 NOTE — TELEPHONE ENCOUNTER
Reason for Call:  Other call back    Detailed comments: Pt was seen in June and was told to have holter monitor put on when heart is acting up. Pt states it lasts for about a week and is going on now. She cannot get in to have it put in for another week. Can she go somewhere else besides [place]?    Phone Number Patient can be reached at: Home number on file [phone] (home)    Best Time: any    Can we leave a detailed message on this number?     Call taken on 11/29/2018 at 1:16 PM by Priscilla Deras

## 2018-11-29 NOTE — TELEPHONE ENCOUNTER
"S-(situation): Having heart palpitations again and cannot get Holter monitor through [facility] for another week.     B-(background): Saw Dr. Ashish Kelly 6/29/18 for palpitations and Holter monitor was recommended then.     A-(assessment):   The heart palpitations have \"happened once between now and then,\" referring to 6/29/18 OV. \"Happening again for like two days.\"  Neck, jaw, shoulder, back and/or arm pain? \"I am having a little bit, pain is down the back of my neck and down my back,\" including shoulders.   No diaphoresis or clamminess.  Gets \"extremely, weirdly tired.\"  I can feel my heart doing different things. It's almost like missed beats.\"  Has a \"slight\" headache right now.  Had just one cup of coffee today, usually has 1-2/day.   \"Little bit\" of dizziness. \"Feel a little off kilter.\"   Has not checked her HR.   Pt is concerned if she has to wait the sx will be gone.    R-(recommendations): Advised pt she go to Urgent Care for evaluation, EKG, labs, etc. \"I hate to do that because it's happened before.\" Pt said she could do EKG where she works, discouraged this as she may require medical intervention, labs etc. Needs to have done in UC/ED environment for interpretation and appropriate intervention.  Only other option would be to call [facility] to see if they can help her find another location that has Holter monitor available sooner. Pt agreeable to this plan.      Reference used:    Telephone Triage Protocol for Nurses, 5th edition, Heart Rate Problems    Lupe SALINAS RN          "

## 2018-12-05 ENCOUNTER — OFFICE VISIT (OUTPATIENT)
Dept: OBGYN | Facility: CLINIC | Age: 43
End: 2018-12-05
Payer: COMMERCIAL

## 2018-12-05 VITALS
DIASTOLIC BLOOD PRESSURE: 58 MMHG | RESPIRATION RATE: 16 BRPM | HEART RATE: 84 BPM | SYSTOLIC BLOOD PRESSURE: 113 MMHG | BODY MASS INDEX: 24.11 KG/M2 | WEIGHT: 131 LBS | HEIGHT: 62 IN | TEMPERATURE: 97.7 F

## 2018-12-05 DIAGNOSIS — N92.6 IRREGULAR MENSES: ICD-10-CM

## 2018-12-05 DIAGNOSIS — R10.2 PELVIC PAIN IN FEMALE: ICD-10-CM

## 2018-12-05 DIAGNOSIS — N92.1 METRORRHAGIA: Primary | ICD-10-CM

## 2018-12-05 LAB
FSH SERPL-ACNC: 5 IU/L
TSH SERPL DL<=0.005 MIU/L-ACNC: 1.24 MU/L (ref 0.4–4)

## 2018-12-05 PROCEDURE — 83001 ASSAY OF GONADOTROPIN (FSH): CPT | Performed by: OBSTETRICS & GYNECOLOGY

## 2018-12-05 PROCEDURE — 84443 ASSAY THYROID STIM HORMONE: CPT | Performed by: OBSTETRICS & GYNECOLOGY

## 2018-12-05 PROCEDURE — 99214 OFFICE O/P EST MOD 30 MIN: CPT | Performed by: OBSTETRICS & GYNECOLOGY

## 2018-12-05 PROCEDURE — 36415 COLL VENOUS BLD VENIPUNCTURE: CPT | Performed by: OBSTETRICS & GYNECOLOGY

## 2018-12-05 RX ORDER — NORETHINDRONE ACETATE AND ETHINYL ESTRADIOL .02; 1 MG/1; MG/1
1 TABLET ORAL DAILY
Qty: 84 TABLET | Refills: 3 | Status: SHIPPED | OUTPATIENT
Start: 2018-12-05 | End: 2019-10-09

## 2018-12-05 NOTE — MR AVS SNAPSHOT
"              After Visit Summary   12/5/2018    Leila Azul    MRN: [id number]           Patient Information     Date Of Birth          1975        Visit Information        Provider Department      12/5/2018 3:30 PM David Gayle MD [facility]        Today's Diagnoses     Metrorrhagia    -  1    Irregular menses        Pelvic pain in female           Follow-ups after your visit        Follow-up notes from your care team     Return if symptoms worsen or fail to improve.      Who to contact     If you have questions or need follow up information about today's clinic visit or your schedule please contact [facility] directly at [phone].  Normal or non-critical lab and imaging results will be communicated to you by MyChart, letter or phone within 4 business days after the clinic has received the results. If you do not hear from us within 7 days, please contact the clinic through [employer] or phone. If you have a critical or abnormal lab result, we will notify you by phone as soon as possible.  Submit refill requests through [employer] or call your pharmacy and they will forward the refill request to us. Please allow 3 business days for your refill to be completed.          Additional Information About Your Visit        MyChart Information     [employer] gives you secure access to your electronic health record. If you see a primary care provider, you can also send messages to your care team and make appointments. If you have questions, please call your primary care clinic.  If you do not have a primary care provider, please call [phone] and they will assist you.        Care EveryWhere ID     This is your Care EveryWhere ID. This could be used by other organizations to access your [facility] medical records  [id number]        Your Vitals Were     Pulse Temperature Respirations Height Last Period BMI (Body Mass Index)    84 97.7  F (36.5  C) 16 5' 2\" (1.575 m) 11/24/2018 " 23.96 kg/m2       Blood Pressure from Last 3 Encounters:   12/05/18 113/58   06/29/18 97/66   08/16/17 114/66    Weight from Last 3 Encounters:   12/05/18 131 lb (59.4 kg)   06/29/18 130 lb 3.2 oz (59.1 kg)   08/16/17 134 lb 6.4 oz (61 kg)              We Performed the Following     Follicle stimulating hormone     TSH with free T4 reflex          Today's Medication Changes          These changes are accurate as of 12/5/18 11:59 PM.  If you have any questions, ask your nurse or doctor.               Start taking these medicines.        Dose/Directions    norethindrone-ethinyl estradiol 1-20 MG-MCG tablet   Commonly known as:  MICROGESTIN 1/20   Used for:  Metrorrhagia, Irregular menses   Started by:  David Gayle MD        Dose:  1 tablet   Take 1 tablet by mouth daily   Quantity:  84 tablet   Refills:  3            Where to get your medicines      These medications were sent to [facility] [address]  [address] [address]     Phone:  [phone]     norethindrone-ethinyl estradiol 1-20 MG-MCG tablet                Primary Care Provider Office Phone # Fax #    Lili Tyson [phone] [address] [address]        Equal Access to Services     ROWDY JACOBO AH: Hadii zayda dillon hadasho Somyah, waaxda luqadaha, qaybta kaalmada adejaydon, ann-marie lugo. So [facility] [phone].    ATENCIÓN: Si habla español, tiene a boothe disposición servicios gratuitos de asistencia lingüística. Nevin al [phone].    We comply with applicable federal civil rights laws and [place] laws. We do not discriminate on the basis of race, color, national origin, age, disability, sex, sexual orientation, or gender identity.            Thank you!     Thank you for choosing [facility]  for your care. Our goal is always to provide you with excellent care. Hearing back from our patients is one way we can  continue to improve our services. Please take a few minutes to complete the written survey that you may receive in the mail after your visit with us. Thank you!             Your Updated Medication List - Protect others around you: Learn how to safely use, store and throw away your medicines at www.disposemymeds.org.          This list is accurate as of 12/5/18 11:59 PM.  Always use your most recent med list.                   Brand Name Dispense Instructions for use Diagnosis    norethindrone-ethinyl estradiol 1-20 MG-MCG tablet    MICROGESTIN 1/20    84 tablet    Take 1 tablet by mouth daily    Metrorrhagia, Irregular menses

## 2018-12-09 NOTE — PROGRESS NOTES
Your results are back and they are normal.  you do not appear to be in menopause...Yet  Your thyroid is functioning normally  David Gayle

## 2018-12-09 NOTE — PROGRESS NOTES
CC:  Consult from herself for irregular periods and sharp pelvic pain  HPI:  Leila Azul is a 42 year old female is a   P 3124.  Patient's last menstrual period was 2018.  Menses are irregular, lasting 5 days.  She describes a flow currently is normal amounts.    She noted late periods up to 3 weeks late after having none for 3 months.  Menses since that time have been 1 week earlier each month lasting 5 days.    She describes sharp pelvic pains lasting seconds to minutes.  It is mid abdominal with intense pressure.  She has describes it as 3 out of 10, coming and going one episode of 4 out of 10.  The seem to be bilateral she has had no hot flashes.  She is sexually active using condoms she does not desire any further pregnancies.  She has used birth control pills in the past and is willing to try them again.    Patients records are available and reviewed at today's visit.    Past GYN history:  No STD history       Last PAP smear:  Normal  Last TSH:   TSH   Date Value Ref Range Status   2018 1.24 0.40 - 4.00 mU/L Final    , normal?  Yes    Past Medical History:   Diagnosis Date     Anemia      increased blood loss after 1st delivery     Chickenpox       labor ,,2011    -bedrest/terbutaline wiith 1st two preg.       Past Surgical History:   Procedure Laterality Date     SURGICAL HISTORY OF -       eye surgery age 2       Family History   Problem Relation Age of Onset     Circulatory Mother      DVT OF THE LEG     Cerebrovascular Disease Maternal Grandfather      Cancer Paternal Grandmother      age 55, Breast Ca     Depression Paternal Grandmother      GASTROINTESTINAL DISEASE Father      ulcerative colitis       Allergies: Amoxicillin; Trimethoprim sulfate; and Doxycycline    Current Outpatient Prescriptions   Medication Sig Dispense Refill     norethindrone-ethinyl estradiol (MICROGESTIN ) 1-20 MG-MCG tablet Take 1 tablet by mouth daily 84 tablet 3       ROS:  C: NEGATIVE for  "fever, chills, change in weight  I: NEGATIVE for worrisome rashes, moles or lesions  E: NEGATIVE for vision changes or irritation  E/M: NEGATIVE for ear, mouth and throat problems  R: NEGATIVE for significant cough or SOB  CV: NEGATIVE for chest pain, palpitations or peripheral edema  GI: NEGATIVE for nausea, abdominal pain, heartburn, or change in bowel habits  : NEGATIVE for frequency, dysuria, hematuria, vaginal discharge  M: NEGATIVE for significant arthralgias or myalgia  N: NEGATIVE for weakness, dizziness or paresthesias  E: NEGATIVE for temperature intolerance, skin/hair changes  P: NEGATIVE for changes in mood or affect    EXAM:  Blood pressure 113/58, pulse 84, temperature 97.7  F (36.5  C), resp. rate 16, height 5' 2\" (1.575 m), weight 131 lb (59.4 kg), last menstrual period 11/24/2018, not currently breastfeeding.   BMI= Body mass index is 23.96 kg/(m^2).  General - pleasant female in no acute distress.  Neck - supple without lymphadenopathy or thyromegaly.  Lungs - clear to auscultation bilaterally.  Heart - regular rate and rhythm without murmur.  Breast -deferred  Abdomen - soft, nontender, nondistended, no hepatosplenomegaly.  PMI is suprapubic and bilateral.  There is no guarding rigidity.  No rebound tenderness.  Pelvic - EG: normal adult female,   BUS: within normal limits,   Vagina: well rugated, no discharge, Cervix: no lesions or CMT,   Uterus: firm, normal sized and nontender,   Adnexae: no masses or tenderness.  Rectovaginal - deferred.  Musculoskeletal - no gross deformities.  Neurological - normal strength, sensation, and mental status.      ASSESSMENT/PLAN:  (N92.1) Metrorrhagia  (primary encounter diagnosis)  Comment: Intermittent periods will check for thyroid dysfunction as well as menopause or family history notes late menopause her mother going through in her 60s  Plan: TSH with free T4 reflex, Follicle stimulating         hormone, norethindrone-ethinyl estradiol         " (MICROGESTIN 1/20) 1-20 MG-MCG tablet            (N92.6) Irregular menses  Comment:   Plan: TSH with free T4 reflex, Follicle stimulating         hormone, norethindrone-ethinyl estradiol         (MICROGESTIN 1/20) 1-20 MG-MCG tablet          (R10.2) pelvic pain in female  Comment: No focalizing on examination  Plan: Consider ultrasound of the pelvis if the symptoms continue.    Daivd Gayle

## 2019-03-13 ENCOUNTER — OFFICE VISIT (OUTPATIENT)
Dept: FAMILY MEDICINE | Facility: CLINIC | Age: 44
End: 2019-03-13
Payer: COMMERCIAL

## 2019-03-13 ENCOUNTER — ANCILLARY PROCEDURE (OUTPATIENT)
Dept: GENERAL RADIOLOGY | Facility: CLINIC | Age: 44
End: 2019-03-13
Attending: NURSE PRACTITIONER
Payer: COMMERCIAL

## 2019-03-13 VITALS
HEART RATE: 80 BPM | TEMPERATURE: 97.9 F | WEIGHT: 130.2 LBS | BODY MASS INDEX: 23.81 KG/M2 | SYSTOLIC BLOOD PRESSURE: 102 MMHG | DIASTOLIC BLOOD PRESSURE: 50 MMHG

## 2019-03-13 DIAGNOSIS — R50.9 FEVER, UNSPECIFIED FEVER CAUSE: ICD-10-CM

## 2019-03-13 DIAGNOSIS — R05.9 COUGH: ICD-10-CM

## 2019-03-13 DIAGNOSIS — J18.9 PNEUMONIA OF RIGHT MIDDLE LOBE DUE TO INFECTIOUS ORGANISM: Primary | ICD-10-CM

## 2019-03-13 LAB
FLUAV+FLUBV AG SPEC QL: NEGATIVE
FLUAV+FLUBV AG SPEC QL: NEGATIVE
SPECIMEN SOURCE: NORMAL

## 2019-03-13 PROCEDURE — 87804 INFLUENZA ASSAY W/OPTIC: CPT | Mod: 59 | Performed by: NURSE PRACTITIONER

## 2019-03-13 PROCEDURE — 99214 OFFICE O/P EST MOD 30 MIN: CPT | Performed by: NURSE PRACTITIONER

## 2019-03-13 PROCEDURE — 71046 X-RAY EXAM CHEST 2 VIEWS: CPT

## 2019-03-13 RX ORDER — AZITHROMYCIN 250 MG/1
TABLET, FILM COATED ORAL
Qty: 6 TABLET | Refills: 0 | Status: SHIPPED | OUTPATIENT
Start: 2019-03-13 | End: 2019-10-09

## 2019-03-13 NOTE — PROGRESS NOTES
SUBJECTIVE:   Leila Azul is a 43 year old female who presents to clinic today for the following health issues:    ENT Symptoms             Symptoms: cc Present Absent Comment   Fever/Chills  x  Fever    Fatigue  x     Muscle Aches  x  Body aches    Eye Irritation       Sneezing       Nasal Omar/Drg  x  Little bit    Sinus Pressure/Pain       Loss of smell       Dental pain       Sore Throat       Swollen Glands       Ear Pain/Fullness       Cough  x     Wheeze       Chest Pain  x  Burning    Shortness of breath       Rash       Other  x  Headaches and nausea      Symptom duration:  3 days ago    Symptom severity:  worse    Treatments tried:  Motrin    Contacts:  None              Problem list and histories reviewed & adjusted, as indicated.  Additional history: as documented    Patient Active Problem List   Diagnosis     Hair loss     CARDIOVASCULAR SCREENING; LDL GOAL LESS THAN 160     Influenza A     CAP (community acquired pneumonia)     Past Surgical History:   Procedure Laterality Date     SURGICAL HISTORY OF -       eye surgery age 2       Social History     Tobacco Use     Smoking status: Never Smoker     Smokeless tobacco: Never Used   Substance Use Topics     Alcohol use: No     Comment: rarely/quit with pregnancy     Family History   Problem Relation Age of Onset     Circulatory Mother         DVT OF THE LEG     Cerebrovascular Disease Maternal Grandfather      Cancer Paternal Grandmother         age 55, Breast Ca     Depression Paternal Grandmother      Gastrointestinal Disease Father         ulcerative colitis         Current Outpatient Medications   Medication Sig Dispense Refill     azithromycin (ZITHROMAX Z-KARY) 250 MG tablet Take 2 tablets on day 1 and then take 1 tablet days 2-5 6 tablet 0     norethindrone-ethinyl estradiol (MICROGESTIN 1/20) 1-20 MG-MCG tablet Take 1 tablet by mouth daily (Patient not taking: Reported on 3/13/2019) 84 tablet 3     Allergies   Allergen Reactions      Amoxicillin Rash     Trimethoprim Sulfate Other (See Comments)     Vomiting blood after taking medication.     Doxycycline Rash     Labs reviewed in EPIC    Reviewed and updated as needed this visit by clinical staff  Tobacco  Allergies  Meds  Problems  Med Hx  Surg Hx  Fam Hx  Soc Hx        Reviewed and updated as needed this visit by Provider  Tobacco  Allergies  Meds  Problems  Med Hx  Surg Hx  Fam Hx         ROS:  Constitutional, HEENT, cardiovascular, pulmonary, GI, , musculoskeletal, neuro, skin, endocrine and psych systems are negative, except as otherwise noted.    OBJECTIVE:     /50 (BP Location: Right arm, Patient Position: Chair, Cuff Size: Adult Regular)   Pulse 80   Temp 97.9  F (36.6  C) (Tympanic)   Wt 59.1 kg (130 lb 3.2 oz)   BMI 23.81 kg/m    Body mass index is 23.81 kg/m .   GENERAL: healthy, alert and no distress, nontoxic in appearance  EYES: Eyes grossly normal to inspection, PERRL and conjunctivae and sclerae normal  HENT: ear canals and TM's normal, nose and mouth without ulcers or lesions  NECK: no adenopathy, supple with full ROM  RESP: lungs clear to auscultation - no rales, rhonchi or wheezes  CV: regular rate and rhythm, normal S1 S2, no S3 or S4, no murmur, click or rub, no peripheral edema  ABDOMEN: soft, nontender, no hepatosplenomegaly, no masses and bowel sounds normal  MS: no gross musculoskeletal defects noted, no edema  No rash    Diagnostic Test Results:XR CHEST 2 VW 3/13/2019 11:53 AM     HISTORY: Fever. Cough.     COMPARISON: 8/16/2017.                                                                      IMPRESSION: 2 views of the chest show mild vague opacity in the right  infrahilar region. This is unimpressive on the lateral view. It could  therefore just relate to some mild atelectasis. However, early  bronchopneumonia is not completely excluded in the appropriate  clinical setting. Heart size is normal.      BERNADETTE TAVAREZ MD  Results for orders  placed or performed in visit on 03/13/19 (from the past 24 hour(s))   Influenza A/B antigen   Result Value Ref Range    Influenza A/B Agn Specimen Nasal     Influenza A Negative NEG^Negative    Influenza B Negative NEG^Negative       ASSESSMENT/PLAN:     Problem List Items Addressed This Visit     None      Visit Diagnoses     Pneumonia of right middle lobe due to infectious organism (H)    -  Primary    Relevant Medications    azithromycin (ZITHROMAX Z-KARY) 250 MG tablet    Fever, unspecified fever cause        Relevant Orders    Influenza A/B antigen (Completed)    XR Chest 2 Views (Completed)    Cough        Relevant Orders    Influenza A/B antigen (Completed)    XR Chest 2 Views (Completed)               Patient Instructions     Increase rest and fluids. Tylenol and/or Ibuprofen for comfort. Cool mist vaporizer. If your symptoms worsen or do not resolve follow up with your primary care provider in 1 week and sooner if needed.      antibiotic as directed  Indications for emergent return to emergency department discussed with patient, who verbalized good understanding and agreement.  Patient understands the limitations of today's evaluation.           Patient Education     Pneumonia (Adult)  Pneumonia is an infection deep within the lungs. It is in the small air sacs (alveoli). Pneumonia may be caused by a virus or bacteria. Pneumonia caused by bacteria is usually treated with an antibiotic. Severe cases may need to be treated in the hospital. Milder cases can be treated at home. Symptoms usually start to get better during the first 2 days of treatment.    Home care  Follow these guidelines when caring for yourself at home:    Rest at home for the first 2 to 3 days, or until you feel stronger. Don t let yourself get overly tired when you go back to your activities.    Stay away from cigarette smoke - yours or other people s.    You may use acetaminophen or ibuprofen to control fever or pain, unless another  medicine was prescribed. If you have chronic liver or kidney disease, talk with your healthcare provider before using these medicines. Also talk with your provider if you ve had a stomach ulcer or gastrointestinal bleeding. Don t give aspirin to anyone younger than 18 years of age who is ill with a fever. It may cause severe liver damage.    Your appetite may be poor, so a light diet is fine.    Drink 6 to 8 glasses of fluids every day to make sure you are getting enough fluids. Beverages can include water, sport drinks, sodas without caffeine, juices, tea, or soup. Fluids will help loosen secretions in the lung. This will make it easier for you to cough up the phlegm (sputum). If you also have heart or kidney disease, check with your healthcare provider before you drink extra fluids.    Take antibiotic medicine prescribed until it is all gone, even if you are feeling better after a few days.  Follow-up care  Follow up with your healthcare provider in the next 2 to 3 days, or as advised. This is to be sure the medicine is helping you get better.  If you are 65 or older, you should get a pneumococcal vaccine and a yearly flu (influenza) shot. You should also get these vaccines if you have chronic lung disease like asthma, emphysema, or COPD. Recently, a second type of pneumonia vaccine has become available for everyone over 65 years old. This is in addition to the previous vaccine. Ask your provider about this.  When to seek medical advice  Call your healthcare provider right away if any of these occur:    You don t get better within the first 48 hours of treatment    Shortness of breath gets worse    Rapid breathing (more than 25 breaths per minute)    Coughing up blood    Chest pain gets worse with breathing    Fever of 100.4 F (38 C) or higher that doesn t get better with fever medicine    Weakness, dizziness, or fainting that gets worse    Thirst or dry mouth that gets worse    Sinus pain, headache, or a stiff  neck    Chest pain not caused by coughing  Date Last Reviewed: 1/1/2017 2000-2018 [employer]. [address] [place] [address], [place] [address]. All rights reserved. This information is not intended as a substitute for professional medical care. Always follow your healthcare professional's instructions.           Patient Education     When You Have Pneumonia  You have been diagnosed with pneumonia. This is a serious lung infection. Most cases of pneumonia are caused by bacteria. Pneumonia most often occurs in older adults, young children, and people with chronic health problems.  Home care    Take your medicine exactly as directed. Don t skip doses. Continue taking your antibiotics as until they are all gone, even if you start to feel better. This will prevent the pneumonia from coming back.    Drink at least 8 glasses of water daily, unless directed otherwise. This helps to loosen and thin secretions so that you can cough them up.    Use a cool-mist humidifier in your bedroom. Be sure to clean the humidifier daily.    Don t use medicines to suppress your cough unless your cough is dry, painful, or interferes with your sleep. Coughing up mucus is normal. You may use an expectorant if your healthcare provider says it s okay.    You can use warm compresses or a heating pad on the lowest setting to relieve chest discomfort. Use several times a day for 15-20 minutes at a time. To prevent injury to your skin, set the temperature to warm, not hot. Don t put the compress or pad directly on your skin. Make certain it has a cover or wrap it in a towel. This is to prevent skin burns.    Get plenty of rest until your fever, shortness of breath, and chest pain go away.    Plan to get a flu shot every year. The flu is a common cause of pneumonia. Getting a flu shot every year can help prevent both the flu and pneumonia.  Getting the pneumococcal vaccine  Talk with your healthcare provider about getting the pneumococcal  vaccine. Pneumococcal pneumonia is caused by bacteria that spread from person to person. It can cause minor problems, such as ear infections. But it can also turn into life-threatening illnesses of the lungs (pneumonia), the covering of the brain and spinal cord (meningitis), and the blood (bacteremia).  Children under 2 years of age, adults over age 65, people with certain health conditions, and smokers are at the highest risk of pneumococcal disease. This vaccine can help prevent pneumococcal disease in both adults and children. Some people should not have the vaccine. Make sure to ask your healthcare provider if you should have the vaccine.   Follow-up care  Make a follow-up appointment as directed by our staff.  When to call your healthcare provider  Call your healthcare provider right away if you have any of the following:    Fever of 100.4 F (38 C) or higher, or as directed by your healthcare provider    Mucus from the lungs (sputum) that s yellow, green, bloody, or smells bad    A large amount of sputum    Vomiting    Symptoms that get worse  Call 911  Call 911 right away if you have any of the following:    Chest pain    Trouble breathing    Blue lips or fingernails   Date Last Reviewed: 11/1/2016 2000-2018 [employer]. [address] [place] [address]. All rights reserved. This information is not intended as a substitute for professional medical care. Always follow your healthcare professional's instructions.           Patient Education     Treating Pneumonia  Pneumonia is an infection of one or both of the lungs. Pneumonia:    Is usually caused by either a virus or a bacteria    Can be very serious, especially in infants, young children, and older adults. It s also serious for those with other long-term health problems or weakened immune systems.    Is sometimes treated at home and sometimes in the hospital    Antibiotic medicines  Antibiotics may be prescribed for pneumonia  caused by bacteria. They may be pills (oral medicines), or shots (injections). Or they may be given by IV (intravenously) into a vein. If you are taking oral medicines at home:    Fill your prescription and start taking your medicine as soon as you can.    You will likely start to feel better in a day or 2, but don t stop taking the antibiotic.    Use a pill organizer to help you remember to take your medicine.    Let your healthcare provider know if you have side effects.    Take your medicine exactly as directed on the label. Talk to your provider or pharmacist if you have any questions.  Antiviral medicines  Antiviral medicine may be prescribed for pneumonia caused by a virus. For example, antiviral medicine may be prescribed for pneumonia caused by the flu virus. Antibiotics do not work against viruses. If you are taking antiviral medicine at home:    Fill your prescription and start taking your medicine as soon as you can.    Talk with your provider or pharmacist about possible side effects.    Take the medicine exactly as instructed.  To relieve symptoms  There are many medicines that can help relieve symptoms of pneumonia. Some are prescription and some are over-the-counter.  Your healthcare provider may recommend:    Acetaminophen or ibuprofen to lower your fever and to lessen headache or other pain    Cough medicine to loosen mucus or to reduce coughing  Make sure you check with your healthcare provider or pharmacist before taking any over-the-counter medicines.  Special treatments  If you are hospitalized for pneumonia, you may have other therapies, including:    Inhaled medicines to help with breathing or chest congestion    Supplemental oxygen to increase low oxygen levels  Drink fluids and eat healthy  You should eat healthy to help your body fight the infection. Drinking a lot of fluids helps to replace fluids lost from fever and to loosen mucus in your chest.    Diet. Make healthy food choices,  including fruits and vegetables, lean meats and other proteins, 100% whole grain and low- or no-fat dairy products.    Fluids. Drink at least 6 to 8 tall glasses a day. Water and 100% fruit or vegetable juice are best.  Get plenty of rest and sleep  You may be more tired than usual for a while. It is important to get enough sleep at night. It s also important to rest during the day. Talk with your healthcare provider if coughing or other symptoms are interfering with your sleep.  Preventing the spread of germs  The best thing you can do to prevent spreading germs is to wash your hands often. You should:    Rub your hand with soap and water for 20 to 30 seconds.    Clean in between your fingers, the backs of your hands, and around your nails.    Dry your hands on a separate towel or use paper towels.  You should also:    Keep alcohol-based hand  nearby.    Make sure you also clean surfaces that you touch. Use a product that kills all types of germs.    Stay away from others until you are feeling better.  When to call your healthcare provider  Call your healthcare provider if you have any of the following:    Symptoms get worse    Fever continues    Shortness of breath gets worse    Increased mucus or mucus that is darker in color    Coughing gets worse    Lips or fingers are bluish in color    Side effects from your medicine   Date Last Reviewed: 12/1/2016 2000-2018 [employer]. [address] [place] [address], [place] [address]. All rights reserved. This information is not intended as a substitute for professional medical care. Always follow your healthcare professional's instructions.               CATHERINE Carrion [facility]

## 2019-03-13 NOTE — NURSING NOTE
"Chief Complaint   Patient presents with     Generalized Body Aches     Cough       Initial /50 (BP Location: Right arm, Patient Position: Chair, Cuff Size: Adult Regular)   Pulse 80   Temp 97.9  F (36.6  C) (Tympanic)   Wt 59.1 kg (130 lb 3.2 oz)   BMI 23.81 kg/m   Estimated body mass index is 23.81 kg/m  as calculated from the following:    Height as of 12/5/18: 1.575 m (5' 2\").    Weight as of this encounter: 59.1 kg (130 lb 3.2 oz).    Patient presents to the clinic using No DME    Health Maintenance that is potentially due pending provider review:  NONE    n/a    Is there anyone who you would like to be able to receive your results? Not Applicable  If yes have patient fill out LOLY  Keanu Noonan M.A.      "

## 2019-03-13 NOTE — PATIENT INSTRUCTIONS
Increase rest and fluids. Tylenol and/or Ibuprofen for comfort. Cool mist vaporizer. If your symptoms worsen or do not resolve follow up with your primary care provider in 1 week and sooner if needed.      antibiotic as directed  Indications for emergent return to emergency department discussed with patient, who verbalized good understanding and agreement.  Patient understands the limitations of today's evaluation.           Patient Education     Pneumonia (Adult)  Pneumonia is an infection deep within the lungs. It is in the small air sacs (alveoli). Pneumonia may be caused by a virus or bacteria. Pneumonia caused by bacteria is usually treated with an antibiotic. Severe cases may need to be treated in the hospital. Milder cases can be treated at home. Symptoms usually start to get better during the first 2 days of treatment.    Home care  Follow these guidelines when caring for yourself at home:    Rest at home for the first 2 to 3 days, or until you feel stronger. Don t let yourself get overly tired when you go back to your activities.    Stay away from cigarette smoke - yours or other people s.    You may use acetaminophen or ibuprofen to control fever or pain, unless another medicine was prescribed. If you have chronic liver or kidney disease, talk with your healthcare provider before using these medicines. Also talk with your provider if you ve had a stomach ulcer or gastrointestinal bleeding. Don t give aspirin to anyone younger than 18 years of age who is ill with a fever. It may cause severe liver damage.    Your appetite may be poor, so a light diet is fine.    Drink 6 to 8 glasses of fluids every day to make sure you are getting enough fluids. Beverages can include water, sport drinks, sodas without caffeine, juices, tea, or soup. Fluids will help loosen secretions in the lung. This will make it easier for you to cough up the phlegm (sputum). If you also have heart or kidney disease, check with your  healthcare provider before you drink extra fluids.    Take antibiotic medicine prescribed until it is all gone, even if you are feeling better after a few days.  Follow-up care  Follow up with your healthcare provider in the next 2 to 3 days, or as advised. This is to be sure the medicine is helping you get better.  If you are 65 or older, you should get a pneumococcal vaccine and a yearly flu (influenza) shot. You should also get these vaccines if you have chronic lung disease like asthma, emphysema, or COPD. Recently, a second type of pneumonia vaccine has become available for everyone over 65 years old. This is in addition to the previous vaccine. Ask your provider about this.  When to seek medical advice  Call your healthcare provider right away if any of these occur:    You don t get better within the first 48 hours of treatment    Shortness of breath gets worse    Rapid breathing (more than 25 breaths per minute)    Coughing up blood    Chest pain gets worse with breathing    Fever of 100.4 F (38 C) or higher that doesn t get better with fever medicine    Weakness, dizziness, or fainting that gets worse    Thirst or dry mouth that gets worse    Sinus pain, headache, or a stiff neck    Chest pain not caused by coughing  Date Last Reviewed: 1/1/2017 2000-2018 [employer]. [address] [place] [address]. All rights reserved. This information is not intended as a substitute for professional medical care. Always follow your healthcare professional's instructions.           Patient Education     When You Have Pneumonia  You have been diagnosed with pneumonia. This is a serious lung infection. Most cases of pneumonia are caused by bacteria. Pneumonia most often occurs in older adults, young children, and people with chronic health problems.  Home care    Take your medicine exactly as directed. Don t skip doses. Continue taking your antibiotics as until they are all gone, even if you start  to feel better. This will prevent the pneumonia from coming back.    Drink at least 8 glasses of water daily, unless directed otherwise. This helps to loosen and thin secretions so that you can cough them up.    Use a cool-mist humidifier in your bedroom. Be sure to clean the humidifier daily.    Don t use medicines to suppress your cough unless your cough is dry, painful, or interferes with your sleep. Coughing up mucus is normal. You may use an expectorant if your healthcare provider says it s okay.    You can use warm compresses or a heating pad on the lowest setting to relieve chest discomfort. Use several times a day for 15-20 minutes at a time. To prevent injury to your skin, set the temperature to warm, not hot. Don t put the compress or pad directly on your skin. Make certain it has a cover or wrap it in a towel. This is to prevent skin burns.    Get plenty of rest until your fever, shortness of breath, and chest pain go away.    Plan to get a flu shot every year. The flu is a common cause of pneumonia. Getting a flu shot every year can help prevent both the flu and pneumonia.  Getting the pneumococcal vaccine  Talk with your healthcare provider about getting the pneumococcal vaccine. Pneumococcal pneumonia is caused by bacteria that spread from person to person. It can cause minor problems, such as ear infections. But it can also turn into life-threatening illnesses of the lungs (pneumonia), the covering of the brain and spinal cord (meningitis), and the blood (bacteremia).  Children under 2 years of age, adults over age 65, people with certain health conditions, and smokers are at the highest risk of pneumococcal disease. This vaccine can help prevent pneumococcal disease in both adults and children. Some people should not have the vaccine. Make sure to ask your healthcare provider if you should have the vaccine.   Follow-up care  Make a follow-up appointment as directed by our staff.  When to call your  healthcare provider  Call your healthcare provider right away if you have any of the following:    Fever of 100.4 F (38 C) or higher, or as directed by your healthcare provider    Mucus from the lungs (sputum) that s yellow, green, bloody, or smells bad    A large amount of sputum    Vomiting    Symptoms that get worse  Call 911  Call 911 right away if you have any of the following:    Chest pain    Trouble breathing    Blue lips or fingernails   Date Last Reviewed: 11/1/2016 2000-2018 [employer] [address] [place] [address]. All rights reserved. This information is not intended as a substitute for professional medical care. Always follow your healthcare professional's instructions.           Patient Education     Treating Pneumonia  Pneumonia is an infection of one or both of the lungs. Pneumonia:    Is usually caused by either a virus or a bacteria    Can be very serious, especially in infants, young children, and older adults. It s also serious for those with other long-term health problems or weakened immune systems.    Is sometimes treated at home and sometimes in the hospital    Antibiotic medicines  Antibiotics may be prescribed for pneumonia caused by bacteria. They may be pills (oral medicines), or shots (injections). Or they may be given by IV (intravenously) into a vein. If you are taking oral medicines at home:    Fill your prescription and start taking your medicine as soon as you can.    You will likely start to feel better in a day or 2, but don t stop taking the antibiotic.    Use a pill organizer to help you remember to take your medicine.    Let your healthcare provider know if you have side effects.    Take your medicine exactly as directed on the label. Talk to your provider or pharmacist if you have any questions.  Antiviral medicines  Antiviral medicine may be prescribed for pneumonia caused by a virus. For example, antiviral medicine may be prescribed for  pneumonia caused by the flu virus. Antibiotics do not work against viruses. If you are taking antiviral medicine at home:    Fill your prescription and start taking your medicine as soon as you can.    Talk with your provider or pharmacist about possible side effects.    Take the medicine exactly as instructed.  To relieve symptoms  There are many medicines that can help relieve symptoms of pneumonia. Some are prescription and some are over-the-counter.  Your healthcare provider may recommend:    Acetaminophen or ibuprofen to lower your fever and to lessen headache or other pain    Cough medicine to loosen mucus or to reduce coughing  Make sure you check with your healthcare provider or pharmacist before taking any over-the-counter medicines.  Special treatments  If you are hospitalized for pneumonia, you may have other therapies, including:    Inhaled medicines to help with breathing or chest congestion    Supplemental oxygen to increase low oxygen levels  Drink fluids and eat healthy  You should eat healthy to help your body fight the infection. Drinking a lot of fluids helps to replace fluids lost from fever and to loosen mucus in your chest.    Diet. Make healthy food choices, including fruits and vegetables, lean meats and other proteins, 100% whole grain and low- or no-fat dairy products.    Fluids. Drink at least 6 to 8 tall glasses a day. Water and 100% fruit or vegetable juice are best.  Get plenty of rest and sleep  You may be more tired than usual for a while. It is important to get enough sleep at night. It s also important to rest during the day. Talk with your healthcare provider if coughing or other symptoms are interfering with your sleep.  Preventing the spread of germs  The best thing you can do to prevent spreading germs is to wash your hands often. You should:    Rub your hand with soap and water for 20 to 30 seconds.    Clean in between your fingers, the backs of your hands, and around your  nails.    Dry your hands on a separate towel or use paper towels.  You should also:    Keep alcohol-based hand  nearby.    Make sure you also clean surfaces that you touch. Use a product that kills all types of germs.    Stay away from others until you are feeling better.  When to call your healthcare provider  Call your healthcare provider if you have any of the following:    Symptoms get worse    Fever continues    Shortness of breath gets worse    Increased mucus or mucus that is darker in color    Coughing gets worse    Lips or fingers are bluish in color    Side effects from your medicine   Date Last Reviewed: 12/1/2016 2000-2018 [employer]. [address] [place] [address], [place] [address]. All rights reserved. This information is not intended as a substitute for professional medical care. Always follow your healthcare professional's instructions.

## 2019-03-15 ENCOUNTER — OFFICE VISIT (OUTPATIENT)
Dept: FAMILY MEDICINE | Facility: CLINIC | Age: 44
End: 2019-03-15
Payer: COMMERCIAL

## 2019-03-15 ENCOUNTER — TELEPHONE (OUTPATIENT)
Dept: FAMILY MEDICINE | Facility: CLINIC | Age: 44
End: 2019-03-15

## 2019-03-15 ENCOUNTER — NURSE TRIAGE (OUTPATIENT)
Dept: NURSING | Facility: CLINIC | Age: 44
End: 2019-03-15

## 2019-03-15 ENCOUNTER — HOSPITAL ENCOUNTER (EMERGENCY)
Facility: CLINIC | Age: 44
Discharge: HOME | End: 2019-03-15
Attending: EMERGENCY MEDICINE | Admitting: EMERGENCY MEDICINE
Payer: COMMERCIAL

## 2019-03-15 VITALS
HEART RATE: 71 BPM | DIASTOLIC BLOOD PRESSURE: 60 MMHG | SYSTOLIC BLOOD PRESSURE: 90 MMHG | RESPIRATION RATE: 16 BRPM | WEIGHT: 129 LBS | HEIGHT: 62 IN | OXYGEN SATURATION: 98 % | TEMPERATURE: 98.4 F | BODY MASS INDEX: 23.74 KG/M2

## 2019-03-15 VITALS
TEMPERATURE: 98.3 F | OXYGEN SATURATION: 98 % | BODY MASS INDEX: 23.92 KG/M2 | DIASTOLIC BLOOD PRESSURE: 65 MMHG | WEIGHT: 130 LBS | SYSTOLIC BLOOD PRESSURE: 101 MMHG | HEART RATE: 72 BPM | HEIGHT: 62 IN | RESPIRATION RATE: 18 BRPM

## 2019-03-15 DIAGNOSIS — Z87.01 HISTORY OF PNEUMONIA: ICD-10-CM

## 2019-03-15 DIAGNOSIS — R10.9 FLANK PAIN: Primary | ICD-10-CM

## 2019-03-15 DIAGNOSIS — R10.9 BILATERAL FLANK PAIN: ICD-10-CM

## 2019-03-15 LAB
ALBUMIN UR-MCNC: NEGATIVE MG/DL
ANION GAP SERPL CALCULATED.3IONS-SCNC: 2 MMOL/L (ref 3–14)
APPEARANCE UR: CLEAR
BACTERIA #/AREA URNS HPF: ABNORMAL /HPF
BASOPHILS # BLD AUTO: 0 10E9/L (ref 0–0.2)
BASOPHILS NFR BLD AUTO: 0.8 %
BILIRUB UR QL STRIP: NEGATIVE
BUN SERPL-MCNC: 12 MG/DL (ref 7–30)
CALCIUM SERPL-MCNC: 8.6 MG/DL (ref 8.5–10.1)
CHLORIDE SERPL-SCNC: 103 MMOL/L (ref 94–109)
CO2 SERPL-SCNC: 31 MMOL/L (ref 20–32)
COLOR UR AUTO: YELLOW
CREAT SERPL-MCNC: 0.68 MG/DL (ref 0.52–1.04)
DIFFERENTIAL METHOD BLD: ABNORMAL
EOSINOPHIL # BLD AUTO: 0.1 10E9/L (ref 0–0.7)
EOSINOPHIL NFR BLD AUTO: 3.5 %
ERYTHROCYTE [DISTWIDTH] IN BLOOD BY AUTOMATED COUNT: 13 % (ref 10–15)
GFR SERPL CREATININE-BSD FRML MDRD: >90 ML/MIN/{1.73_M2}
GLUCOSE SERPL-MCNC: 96 MG/DL (ref 70–99)
GLUCOSE UR STRIP-MCNC: NEGATIVE MG/DL
HCG UR QL: NEGATIVE
HCT VFR BLD AUTO: 39.3 % (ref 35–47)
HGB BLD-MCNC: 12.8 G/DL (ref 11.7–15.7)
HGB UR QL STRIP: ABNORMAL
KETONES UR STRIP-MCNC: NEGATIVE MG/DL
LEUKOCYTE ESTERASE UR QL STRIP: NEGATIVE
LYMPHOCYTES # BLD AUTO: 1.3 10E9/L (ref 0.8–5.3)
LYMPHOCYTES NFR BLD AUTO: 34.1 %
MCH RBC QN AUTO: 29.6 PG (ref 26.5–33)
MCHC RBC AUTO-ENTMCNC: 32.6 G/DL (ref 31.5–36.5)
MCV RBC AUTO: 91 FL (ref 78–100)
MONOCYTES # BLD AUTO: 0.7 10E9/L (ref 0–1.3)
MONOCYTES NFR BLD AUTO: 17.8 %
NEUTROPHILS # BLD AUTO: 1.6 10E9/L (ref 1.6–8.3)
NEUTROPHILS NFR BLD AUTO: 43.8 %
NITRATE UR QL: NEGATIVE
NON-SQ EPI CELLS #/AREA URNS LPF: ABNORMAL /LPF
PH UR STRIP: 7 PH (ref 5–7)
PLATELET # BLD AUTO: 233 10E9/L (ref 150–450)
POTASSIUM SERPL-SCNC: 4.7 MMOL/L (ref 3.4–5.3)
RBC # BLD AUTO: 4.32 10E12/L (ref 3.8–5.2)
RBC #/AREA URNS AUTO: ABNORMAL /HPF
SODIUM SERPL-SCNC: 136 MMOL/L (ref 133–144)
SOURCE: ABNORMAL
SP GR UR STRIP: <=1.005 (ref 1–1.03)
UROBILINOGEN UR STRIP-ACNC: 0.2 EU/DL (ref 0.2–1)
WBC # BLD AUTO: 3.7 10E9/L (ref 4–11)
WBC #/AREA URNS AUTO: ABNORMAL /HPF

## 2019-03-15 PROCEDURE — 81025 URINE PREGNANCY TEST: CPT | Performed by: EMERGENCY MEDICINE

## 2019-03-15 PROCEDURE — 99283 EMERGENCY DEPT VISIT LOW MDM: CPT | Performed by: EMERGENCY MEDICINE

## 2019-03-15 PROCEDURE — 87086 URINE CULTURE/COLONY COUNT: CPT | Performed by: NURSE PRACTITIONER

## 2019-03-15 PROCEDURE — 81001 URINALYSIS AUTO W/SCOPE: CPT | Performed by: NURSE PRACTITIONER

## 2019-03-15 PROCEDURE — 85025 COMPLETE CBC W/AUTO DIFF WBC: CPT | Performed by: NURSE PRACTITIONER

## 2019-03-15 PROCEDURE — 99284 EMERGENCY DEPT VISIT MOD MDM: CPT | Mod: Z6 | Performed by: EMERGENCY MEDICINE

## 2019-03-15 PROCEDURE — 99213 OFFICE O/P EST LOW 20 MIN: CPT | Performed by: NURSE PRACTITIONER

## 2019-03-15 PROCEDURE — 80048 BASIC METABOLIC PNL TOTAL CA: CPT | Performed by: NURSE PRACTITIONER

## 2019-03-15 PROCEDURE — 36415 COLL VENOUS BLD VENIPUNCTURE: CPT | Performed by: NURSE PRACTITIONER

## 2019-03-15 ASSESSMENT — MIFFLIN-ST. JEOR
SCORE: 1189.42
SCORE: 1197.93

## 2019-03-15 NOTE — TELEPHONE ENCOUNTER
Clinic Action Needed:  Yes, callback  FNA Triage Call  Presenting Problem:    Leila was diagnosed with pneumonia on Wednesday March 13th.  Last night kidneys started aching.  Leila is having lower flank pain on both sides and feels it is due to antibiotic that she is on Z-Arden.  Please phone Leila today.  Leila is requesting to speak with VIVIAN King.      Routed to:  RN Pool    Please be sure to close this encounter once this patient's issue/question has been addressed.    Chikis Nolasco RN/[facility]

## 2019-03-15 NOTE — PATIENT INSTRUCTIONS
Finish the Z pack, push fluids and rest.  Will be notified of pending labs and urine culture.      Our Clinic hours are:  Mondays    7:20 am - 7 pm  Tues -  Fri  7:20 am - 5 pm    Clinic Phone: [phone]    The clinic lab opens at 7:30 am Mon - Fri and appointments are required.    [facility]  Ph. [phone]  Monday  8 am - 7pm  Tues - Fri 8 am - 5:30 pm

## 2019-03-15 NOTE — ED AVS SNAPSHOT
[facility] Emergency Department  [address] [address]  Phone:  [phone]  Fax:  [phone]                                    Leila Azul   MRN: [id number]    Department:  [facility] Emergency Department   Date of Visit:  3/15/2019           After Visit Summary Signature Page    I have received my discharge instructions, and my questions have been answered. I have discussed any challenges I see with this plan with the nurse or doctor.    ..........................................................................................................................................  Patient/Patient Representative Signature      ..........................................................................................................................................  Patient Representative Print Name and Relationship to Patient    ..................................................               ................................................  Date                                   Time    ..........................................................................................................................................  Reviewed by Signature/Title    ...................................................              ..............................................  Date                                               Time          22EPIC Rev 08/18

## 2019-03-15 NOTE — TELEPHONE ENCOUNTER
Leila was diagnosed with pneumonia on Wednesday March 13th.  Last night kidneys started aching.  Leila is having lower flank pain on both sides and feels it is due to antibiotic that she is on Z-Arden.  Please phone Leila today.  Leila is requesting to speak with VIVIAN King.

## 2019-03-15 NOTE — PROGRESS NOTES
"  SUBJECTIVE:   Leila Azul is a 43 year old female who presents to clinic today for the following health issues:  not on the BCP    Pt is Zpac and 30 hours after starting she has gotten bilateral flank pain and diarrhea.        Problem list and histories reviewed & adjusted, as indicated.  Additional history: states she's had pneumonia twice now, first time she got \"septic\" so she's a bit nervous about her health.  She's been on a Zithromax for the past 2 days and now she has pain \"in her kidneys\" but she rubs more in her lower lumbar region. Denies any urinary changes. No fever or chills.  Has not been coughing a lot and is generally feeling a little better.      Patient Active Problem List   Diagnosis     Hair loss     CARDIOVASCULAR SCREENING; LDL GOAL LESS THAN 160     Influenza A     CAP (community acquired pneumonia)     Past Surgical History:   Procedure Laterality Date     SURGICAL HISTORY OF -       eye surgery age 2       Social History     Tobacco Use     Smoking status: Never Smoker     Smokeless tobacco: Never Used   Substance Use Topics     Alcohol use: Yes     Comment: rarely     Family History   Problem Relation Age of Onset     Circulatory Mother         DVT OF THE LEG     Cerebrovascular Disease Maternal Grandfather      Cancer Paternal Grandmother         age 55, Breast Ca     Depression Paternal Grandmother      Gastrointestinal Disease Father         ulcerative colitis         Current Outpatient Medications   Medication Sig Dispense Refill     azithromycin (ZITHROMAX Z-KARY) 250 MG tablet Take 2 tablets on day 1 and then take 1 tablet days 2-5 6 tablet 0     norethindrone-ethinyl estradiol (MICROGESTIN 1/20) 1-20 MG-MCG tablet Take 1 tablet by mouth daily (Patient not taking: Reported on 3/15/2019) 84 tablet 3     Allergies   Allergen Reactions     Amoxicillin Rash     Trimethoprim Sulfate Other (See Comments)     Vomiting blood after taking medication.     Doxycycline Rash     BP Readings " "from Last 3 Encounters:   03/15/19 90/60   03/13/19 102/50   12/05/18 113/58    Wt Readings from Last 3 Encounters:   03/15/19 58.5 kg (129 lb)   03/13/19 59.1 kg (130 lb 3.2 oz)   12/05/18 59.4 kg (131 lb)                    Reviewed and updated as needed this visit by clinical staff  Tobacco  Allergies  Meds  Med Hx  Surg Hx  Fam Hx  Soc Hx      Reviewed and updated as needed this visit by Provider          ROS: 10 point ROS neg other than the symptoms noted above in the HPI.    OBJECTIVE:     BP 90/60 (BP Location: Right arm, Patient Position: Chair, Cuff Size: Adult Regular)   Pulse 71   Temp 98.4  F (36.9  C) (Oral)   Resp 16   Ht 1.568 m (5' 1.75\")   Wt 58.5 kg (129 lb)   LMP 03/10/2019 (Approximate)   SpO2 98%   BMI 23.79 kg/m    Body mass index is 23.79 kg/m .  GENERAL: healthy, alert and no distress  HENT: ear canals and TM's normal, pharynx without erythema  NECK: no adenopathy, no asymmetry  RESP: lungs clear to auscultation - no rales, rhonchi or wheezes  CV: regular rate and rhythm, normal S1 S2, no S3 or S4, no murmur  ABDOMEN: soft, nontender, no hepatosplenomegaly, no masses and bowel sounds normal, no CVA tenderness  MS: no gross musculoskeletal defects noted      Diagnostic Test Results:  Results for orders placed or performed in visit on 03/15/19 (from the past 24 hour(s))   UA reflex to Microscopic and Culture   Result Value Ref Range    Color Urine Yellow     Appearance Urine Clear     Glucose Urine Negative NEG^Negative mg/dL    Bilirubin Urine Negative NEG^Negative    Ketones Urine Negative NEG^Negative mg/dL    Specific Gravity Urine <=1.005 1.003 - 1.035    Blood Urine Trace (A) NEG^Negative    pH Urine 7.0 5.0 - 7.0 pH    Protein Albumin Urine Negative NEG^Negative mg/dL    Urobilinogen Urine 0.2 0.2 - 1.0 EU/dL    Nitrite Urine Negative NEG^Negative    Leukocyte Esterase Urine Negative NEG^Negative    Source Midstream Urine    Urine Microscopic   Result Value Ref Range    WBC " Urine 0 - 5 OTO5^0 - 5 /HPF    RBC Urine O - 2 OTO2^O - 2 /HPF    Squamous Epithelial /LPF Urine Few FEW^Few /LPF    Bacteria Urine Few (A) NEG^Negative /HPF   CBC with platelets differential   Result Value Ref Range    WBC 3.7 (L) 4.0 - 11.0 10e9/L    RBC Count 4.32 3.8 - 5.2 10e12/L    Hemoglobin 12.8 11.7 - 15.7 g/dL    Hematocrit 39.3 35.0 - 47.0 %    MCV 91 78 - 100 fl    MCH 29.6 26.5 - 33.0 pg    MCHC 32.6 31.5 - 36.5 g/dL    RDW 13.0 10.0 - 15.0 %    Platelet Count 233 150 - 450 10e9/L    % Neutrophils 43.8 %    % Lymphocytes 34.1 %    % Monocytes 17.8 %    % Eosinophils 3.5 %    % Basophils 0.8 %    Absolute Neutrophil 1.6 1.6 - 8.3 10e9/L    Absolute Lymphocytes 1.3 0.8 - 5.3 10e9/L    Absolute Monocytes 0.7 0.0 - 1.3 10e9/L    Absolute Eosinophils 0.1 0.0 - 0.7 10e9/L    Absolute Basophils 0.0 0.0 - 0.2 10e9/L    Diff Method Automated Method        ASSESSMENT/PLAN:             1. Flank pain    - UA reflex to Microscopic and Culture  - Urine Microscopic  - CBC with platelets differential  - Basic metabolic panel  - Urine Culture Aerobic Bacterial  I don't think this is kidney pain, it's lower and likely more back strain. Will await labs.    2. History of pneumonia    She's improving in this regards, finish Z pack.    See Patient Instructions  Patient Instructions   Finish the Z pack, push fluids and rest.  Will be notified of pending labs and urine culture.      Our Clinic hours are:  Mondays    7:20 am - 7 pm  Tues -  Fri  7:20 am - 5 pm    Clinic Phone: [phone]    The clinic lab opens at 7:30 am Mon - Fri and appointments are required.    [facility]  Ph. [phone]  Monday  8 am - 7pm  Tues - Fri 8 am - 5:30 pm             CATHERINE Bernardo CNP  [facility]

## 2019-03-16 LAB
BACTERIA SPEC CULT: NO GROWTH
Lab: NORMAL
SPECIMEN SOURCE: NORMAL

## 2019-03-16 NOTE — DISCHARGE INSTRUCTIONS
Continue to take your prescribed medications for symptoms.  Use acetaminophen 650 mg and ibuprofen 600 mg every 6 hours as needed for pain.  Return to the emergency department if you have worsening symptoms, pain that does not improve, repeated vomiting, no urine output over 8 hours, or other concerns.  Otherwise follow-up in clinic if not improving over the next 3-4 days.

## 2019-03-16 NOTE — ED TRIAGE NOTES
Pt on z-pack for pneumonia started on Wednesday, bilat flank pain started last night, has hx hydronephrosis with pregnancy, this feels similar to that. Took ibuprofen approx 1 hour ago with improvement in pain. Having hot flashes and sweats.

## 2019-03-16 NOTE — ED PROVIDER NOTES
History     Chief Complaint   Patient presents with     Flank Pain     HPI  Leila Azul is a 43 year old female who presents for bilateral flank pain.  The patient reports that over the past 2 days she has been dealing with increased cough and feeling rundown.  She was seen in clinic on 3/13, 2 days ago and diagnosed with community acquired pneumonia and started on azithromycin.  Starting last evening she has had episodes of bilateral flank pain, sharp, radiates to the right lower quadrant, mild to moderate in severity.  The pain comes and goes, improves with ibuprofen.  When this happens she becomes hot and sweaty and feels run down.  She says she feels better from a breathing standpoint, no chest pain or difficulty breathing.  Denies pain with deep inspiration or hemoptysis.  No prior blood clots.  She denies abdominal pain, vomiting or diarrhea.  No dysuria or urinary frequency.  She denies vaginal bleeding or discharge.  Pain is only mild currently.  No prior abdominal surgeries.    She was seen in clinic earlier today for the symptoms where she had urinalysis that was positive for blood but no blood under the microscope, no signs of infection within the urine and a urine culture is pending currently.  Her white blood cell count and hemoglobin are normal and her electrodes were normal.    Allergies:  Allergies   Allergen Reactions     Amoxicillin Rash     Trimethoprim Sulfate Other (See Comments)     Vomiting blood after taking medication.     Doxycycline Rash       Problem List:    Patient Active Problem List    Diagnosis Date Noted     Influenza A 2017     Priority: Medium     CAP (community acquired pneumonia) 2017     Priority: Medium     CARDIOVASCULAR SCREENING; LDL GOAL LESS THAN 160 10/31/2010     Priority: Medium     Hair loss 2010     Priority: Medium        Past Medical History:    Past Medical History:   Diagnosis Date     Anemia      Chickenpox       labor  "2002,2004,2011       Past Surgical History:    Past Surgical History:   Procedure Laterality Date     SURGICAL HISTORY OF -       eye surgery age 2       Family History:    Family History   Problem Relation Age of Onset     Circulatory Mother         DVT OF THE LEG     Cerebrovascular Disease Maternal Grandfather      Cancer Paternal Grandmother         age 55, Breast Ca     Depression Paternal Grandmother      Gastrointestinal Disease Father         ulcerative colitis       Social History:  Marital Status:   [2]  Social History     Tobacco Use     Smoking status: Never Smoker     Smokeless tobacco: Never Used   Substance Use Topics     Alcohol use: Yes     Comment: rarely     Drug use: No        Medications:      azithromycin (ZITHROMAX Z-KARY) 250 MG tablet   norethindrone-ethinyl estradiol (MICROGESTIN 1/20) 1-20 MG-MCG tablet         Review of Systems  Pertinent positives and negatives listed in the HPI, all other systems reviewed and are negative.    Physical Exam   BP: 114/74  Pulse: 72  Temp: 98.3  F (36.8  C)  Resp: 18  Height: 157.5 cm (5' 2\")  Weight: 59 kg (130 lb)  SpO2: 99 %      Physical Exam   Constitutional: She is oriented to person, place, and time. She appears well-developed and well-nourished. She appears distressed.   HENT:   Head: Normocephalic and atraumatic.   Right Ear: External ear normal.   Left Ear: External ear normal.   Nose: Nose normal.   Eyes: Conjunctivae are normal. No scleral icterus.   Neck: Normal range of motion.   Cardiovascular: Normal rate and regular rhythm.   Pulmonary/Chest: Effort normal. No stridor. No respiratory distress. She has no wheezes.   Abdominal: Soft. She exhibits no distension and no mass. There is no tenderness. There is no guarding and no CVA tenderness.   Neurological: She is alert and oriented to person, place, and time.   Skin: Skin is warm and dry. She is not diaphoretic.   Psychiatric: She has a normal mood and affect. Her behavior is normal. "   Nursing note and vitals reviewed.      ED Course        Procedures               Critical Care time:  none               Results for orders placed or performed in visit on 03/15/19 (from the past 24 hour(s))   UA reflex to Microscopic and Culture   Result Value Ref Range    Color Urine Yellow     Appearance Urine Clear     Glucose Urine Negative NEG^Negative mg/dL    Bilirubin Urine Negative NEG^Negative    Ketones Urine Negative NEG^Negative mg/dL    Specific Gravity Urine <=1.005 1.003 - 1.035    Blood Urine Trace (A) NEG^Negative    pH Urine 7.0 5.0 - 7.0 pH    Protein Albumin Urine Negative NEG^Negative mg/dL    Urobilinogen Urine 0.2 0.2 - 1.0 EU/dL    Nitrite Urine Negative NEG^Negative    Leukocyte Esterase Urine Negative NEG^Negative    Source Midstream Urine    Urine Microscopic   Result Value Ref Range    WBC Urine 0 - 5 OTO5^0 - 5 /HPF    RBC Urine O - 2 OTO2^O - 2 /HPF    Squamous Epithelial /LPF Urine Few FEW^Few /LPF    Bacteria Urine Few (A) NEG^Negative /HPF   CBC with platelets differential   Result Value Ref Range    WBC 3.7 (L) 4.0 - 11.0 10e9/L    RBC Count 4.32 3.8 - 5.2 10e12/L    Hemoglobin 12.8 11.7 - 15.7 g/dL    Hematocrit 39.3 35.0 - 47.0 %    MCV 91 78 - 100 fl    MCH 29.6 26.5 - 33.0 pg    MCHC 32.6 31.5 - 36.5 g/dL    RDW 13.0 10.0 - 15.0 %    Platelet Count 233 150 - 450 10e9/L    % Neutrophils 43.8 %    % Lymphocytes 34.1 %    % Monocytes 17.8 %    % Eosinophils 3.5 %    % Basophils 0.8 %    Absolute Neutrophil 1.6 1.6 - 8.3 10e9/L    Absolute Lymphocytes 1.3 0.8 - 5.3 10e9/L    Absolute Monocytes 0.7 0.0 - 1.3 10e9/L    Absolute Eosinophils 0.1 0.0 - 0.7 10e9/L    Absolute Basophils 0.0 0.0 - 0.2 10e9/L    Diff Method Automated Method    Basic metabolic panel   Result Value Ref Range    Sodium 136 133 - 144 mmol/L    Potassium 4.7 3.4 - 5.3 mmol/L    Chloride 103 94 - 109 mmol/L    Carbon Dioxide 31 20 - 32 mmol/L    Anion Gap 2 (L) 3 - 14 mmol/L    Glucose 96 70 - 99 mg/dL    Urea  Nitrogen 12 7 - 30 mg/dL    Creatinine 0.68 0.52 - 1.04 mg/dL    GFR Estimate >90 >60 mL/min/[1.73_m2]    GFR Estimate If Black >90 >60 mL/min/[1.73_m2]    Calcium 8.6 8.5 - 10.1 mg/dL   Urine Culture Aerobic Bacterial   Result Value Ref Range    Specimen Description Midstream Urine     Special Requests Specimen received in preservative     Culture Micro PENDING        Medications - No data to display    Assessments & Plan (with Medical Decision Making)   43-year-old female who presents with intermittent bilateral flank pain while being treated for pneumonia with azithromycin.  Heart 72, blood pressure is 114/74, temperature is 98.3 F, speech was 99% on room air.  She is well-appearing, abdominal exam is benign and not concerning for an acute surgical process such as appendicitis, small bowel obstruction, or cholecystitis.  No CVA tenderness.  She does not appear to be suffering from a kidney stone.  Urine pregnancy test negative.  I suspect her symptoms are related to spiking fevers and myalgias related to her infection.  She is safe to discharge and is encouraged to see him Profen for her symptoms, return if worse, otherwise follow-up in clinic.  The patient is in agreement with this plan.    I have reviewed the nursing notes.    I have reviewed the findings, diagnosis, plan and need for follow up with the patient.          Medication List      There are no discharge medications for this visit.         Final diagnoses:   Bilateral flank pain       3/15/2019   [facility] EMERGENCY DEPARTMENT     Kameron Jim MD  03/16/19 0040

## 2019-03-18 ENCOUNTER — HOSPITAL ENCOUNTER (EMERGENCY)
Facility: CLINIC | Age: 44
Discharge: HOME | End: 2019-03-18
Attending: STUDENT IN AN ORGANIZED HEALTH CARE EDUCATION/TRAINING PROGRAM | Admitting: STUDENT IN AN ORGANIZED HEALTH CARE EDUCATION/TRAINING PROGRAM
Payer: COMMERCIAL

## 2019-03-18 VITALS
TEMPERATURE: 98.5 F | DIASTOLIC BLOOD PRESSURE: 65 MMHG | RESPIRATION RATE: 18 BRPM | OXYGEN SATURATION: 98 % | HEART RATE: 88 BPM | BODY MASS INDEX: 23.92 KG/M2 | SYSTOLIC BLOOD PRESSURE: 100 MMHG | WEIGHT: 130 LBS | HEIGHT: 62 IN

## 2019-03-18 DIAGNOSIS — R19.7 NAUSEA, VOMITING, AND DIARRHEA: ICD-10-CM

## 2019-03-18 DIAGNOSIS — R11.2 NAUSEA, VOMITING, AND DIARRHEA: ICD-10-CM

## 2019-03-18 LAB
ALBUMIN SERPL-MCNC: 4.2 G/DL (ref 3.4–5)
ALP SERPL-CCNC: 57 U/L (ref 40–150)
ALT SERPL W P-5'-P-CCNC: 35 U/L (ref 0–50)
ANION GAP SERPL CALCULATED.3IONS-SCNC: 4 MMOL/L (ref 3–14)
AST SERPL W P-5'-P-CCNC: 30 U/L (ref 0–45)
BASOPHILS # BLD AUTO: 0 10E9/L (ref 0–0.2)
BASOPHILS NFR BLD AUTO: 0.2 %
BILIRUB SERPL-MCNC: 0.6 MG/DL (ref 0.2–1.3)
BUN SERPL-MCNC: 16 MG/DL (ref 7–30)
CALCIUM SERPL-MCNC: 9.1 MG/DL (ref 8.5–10.1)
CHLORIDE SERPL-SCNC: 105 MMOL/L (ref 94–109)
CO2 SERPL-SCNC: 29 MMOL/L (ref 20–32)
CREAT SERPL-MCNC: 0.74 MG/DL (ref 0.52–1.04)
DIFFERENTIAL METHOD BLD: ABNORMAL
EOSINOPHIL # BLD AUTO: 0 10E9/L (ref 0–0.7)
EOSINOPHIL NFR BLD AUTO: 0.4 %
ERYTHROCYTE [DISTWIDTH] IN BLOOD BY AUTOMATED COUNT: 12.2 % (ref 10–15)
GFR SERPL CREATININE-BSD FRML MDRD: >90 ML/MIN/{1.73_M2}
GLUCOSE SERPL-MCNC: 131 MG/DL (ref 70–99)
HCT VFR BLD AUTO: 45.9 % (ref 35–47)
HGB BLD-MCNC: 14.7 G/DL (ref 11.7–15.7)
IMM GRANULOCYTES # BLD: 0 10E9/L (ref 0–0.4)
IMM GRANULOCYTES NFR BLD: 0.3 %
LIPASE SERPL-CCNC: 229 U/L (ref 73–393)
LYMPHOCYTES # BLD AUTO: 0.2 10E9/L (ref 0.8–5.3)
LYMPHOCYTES NFR BLD AUTO: 2.4 %
MCH RBC QN AUTO: 29.1 PG (ref 26.5–33)
MCHC RBC AUTO-ENTMCNC: 32 G/DL (ref 31.5–36.5)
MCV RBC AUTO: 91 FL (ref 78–100)
MONOCYTES # BLD AUTO: 0.4 10E9/L (ref 0–1.3)
MONOCYTES NFR BLD AUTO: 4.4 %
NEUTROPHILS # BLD AUTO: 9.3 10E9/L (ref 1.6–8.3)
NEUTROPHILS NFR BLD AUTO: 92.3 %
NRBC # BLD AUTO: 0 10*3/UL
NRBC BLD AUTO-RTO: 0 /100
PLATELET # BLD AUTO: 283 10E9/L (ref 150–450)
POTASSIUM SERPL-SCNC: 3.9 MMOL/L (ref 3.4–5.3)
PROT SERPL-MCNC: 8.1 G/DL (ref 6.8–8.8)
RBC # BLD AUTO: 5.06 10E12/L (ref 3.8–5.2)
SODIUM SERPL-SCNC: 138 MMOL/L (ref 133–144)
WBC # BLD AUTO: 10 10E9/L (ref 4–11)

## 2019-03-18 PROCEDURE — 25800030 ZZH RX IP 258 OP 636: Performed by: STUDENT IN AN ORGANIZED HEALTH CARE EDUCATION/TRAINING PROGRAM

## 2019-03-18 PROCEDURE — 99284 EMERGENCY DEPT VISIT MOD MDM: CPT | Mod: Z6 | Performed by: STUDENT IN AN ORGANIZED HEALTH CARE EDUCATION/TRAINING PROGRAM

## 2019-03-18 PROCEDURE — 80053 COMPREHEN METABOLIC PANEL: CPT | Performed by: STUDENT IN AN ORGANIZED HEALTH CARE EDUCATION/TRAINING PROGRAM

## 2019-03-18 PROCEDURE — 96361 HYDRATE IV INFUSION ADD-ON: CPT | Performed by: STUDENT IN AN ORGANIZED HEALTH CARE EDUCATION/TRAINING PROGRAM

## 2019-03-18 PROCEDURE — 96374 THER/PROPH/DIAG INJ IV PUSH: CPT | Performed by: STUDENT IN AN ORGANIZED HEALTH CARE EDUCATION/TRAINING PROGRAM

## 2019-03-18 PROCEDURE — 96375 TX/PRO/DX INJ NEW DRUG ADDON: CPT | Performed by: STUDENT IN AN ORGANIZED HEALTH CARE EDUCATION/TRAINING PROGRAM

## 2019-03-18 PROCEDURE — 99284 EMERGENCY DEPT VISIT MOD MDM: CPT | Mod: 25 | Performed by: STUDENT IN AN ORGANIZED HEALTH CARE EDUCATION/TRAINING PROGRAM

## 2019-03-18 PROCEDURE — 25000128 H RX IP 250 OP 636: Performed by: STUDENT IN AN ORGANIZED HEALTH CARE EDUCATION/TRAINING PROGRAM

## 2019-03-18 PROCEDURE — 85025 COMPLETE CBC W/AUTO DIFF WBC: CPT | Performed by: STUDENT IN AN ORGANIZED HEALTH CARE EDUCATION/TRAINING PROGRAM

## 2019-03-18 PROCEDURE — 83690 ASSAY OF LIPASE: CPT | Performed by: STUDENT IN AN ORGANIZED HEALTH CARE EDUCATION/TRAINING PROGRAM

## 2019-03-18 RX ORDER — ONDANSETRON 4 MG/1
4-8 TABLET, ORALLY DISINTEGRATING ORAL EVERY 8 HOURS PRN
Qty: 10 TABLET | Refills: 0 | Status: SHIPPED | OUTPATIENT
Start: 2019-03-18 | End: 2019-03-21

## 2019-03-18 RX ORDER — ONDANSETRON 2 MG/ML
8 INJECTION INTRAMUSCULAR; INTRAVENOUS ONCE
Status: COMPLETED | OUTPATIENT
Start: 2019-03-18 | End: 2019-03-18

## 2019-03-18 RX ORDER — KETOROLAC TROMETHAMINE 15 MG/ML
15 INJECTION, SOLUTION INTRAMUSCULAR; INTRAVENOUS ONCE
Status: COMPLETED | OUTPATIENT
Start: 2019-03-18 | End: 2019-03-18

## 2019-03-18 RX ADMIN — ONDANSETRON 8 MG: 2 INJECTION INTRAMUSCULAR; INTRAVENOUS at 02:13

## 2019-03-18 RX ADMIN — KETOROLAC TROMETHAMINE 15 MG: 15 INJECTION, SOLUTION INTRAMUSCULAR; INTRAVENOUS at 02:52

## 2019-03-18 RX ADMIN — SODIUM CHLORIDE, POTASSIUM CHLORIDE, SODIUM LACTATE AND CALCIUM CHLORIDE 1000 ML: 600; 310; 30; 20 INJECTION, SOLUTION INTRAVENOUS at 02:13

## 2019-03-18 ASSESSMENT — MIFFLIN-ST. JEOR: SCORE: 1197.93

## 2019-03-18 NOTE — ED AVS SNAPSHOT
[facility] Emergency Department  [address] [address]  Phone:  [phone]  Fax:  [phone]                                    Leila Azul   MRN: [id number]    Department:  [facility] Emergency Department   Date of Visit:  3/18/2019           After Visit Summary Signature Page    I have received my discharge instructions, and my questions have been answered. I have discussed any challenges I see with this plan with the nurse or doctor.    ..........................................................................................................................................  Patient/Patient Representative Signature      ..........................................................................................................................................  Patient Representative Print Name and Relationship to Patient    ..................................................               ................................................  Date                                   Time    ..........................................................................................................................................  Reviewed by Signature/Title    ...................................................              ..............................................  Date                                               Time          22EPIC Rev 08/18        none

## 2019-03-18 NOTE — ED NOTES
Rounds completed, patient reports no further vomiting and that nausea is improved. Pt reports generalized body aches, asking for ibuprofen. MD updated. Ice chips given.

## 2019-03-18 NOTE — ED TRIAGE NOTES
"Pt presents due to nausea/vomiting/diarrhea that started tonight. Pt also recently dx with pneumonia, on antibiotics. Pt states breathing is \"slightly heavy\" but denies SOB. Pt also c/o back pain/flank pain. Pt unable to keep down tylenol or ibuprofen or liquids.   "

## 2019-03-18 NOTE — ED PROVIDER NOTES
History     Chief Complaint   Patient presents with     Nausea, Vomiting, & Diarrhea     dx pneumonia on wed, friday flank pain, tonight onset of n/v/d/     Shortness of Breath     worsened of breathing tonight, on zithromax     HPI  Leila Azul is a 43 year old female with past medical history which includes recently diagnosed community-acquired pneumonia on 3/13/2019 and started on oral antibiotics azithromycin who presents for evaluation of nausea, vomiting, and diarrhea.  This patient explains that over the past 4 hours she has had innumerable episodes of vomiting and diarrhea but without abdominal pain or fever.  She believes that respiratory symptoms including cough have improved with oral antibiotic azithromycin.  She denies fever, chills, chest pain, shortness of breath, abdominal pain, back pain, or genitourinary symptoms.  She has taken no symptomatic medications.    Allergies:  Allergies   Allergen Reactions     Amoxicillin Rash     Trimethoprim Sulfate Other (See Comments)     Vomiting blood after taking medication.     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          Past Medical History:    Past Medical History:   Diagnosis Date     Anemia      Chickenpox       labor ,,       Past Surgical History:    Past Surgical History:   Procedure Laterality Date     SURGICAL HISTORY OF -       eye surgery age 2       Family History:    Family History   Problem Relation Age of Onset     Circulatory Mother         DVT OF THE LEG     Cerebrovascular Disease Maternal Grandfather      Cancer Paternal Grandmother         age 55, Breast Ca     Depression Paternal Grandmother      Gastrointestinal Disease Father         ulcerative colitis       Social History:  Marital Status:   [2]  Social History     Tobacco Use     Smoking status: Never Smoker     Smokeless tobacco: Never Used   Substance Use Topics     Alcohol use: Yes     Comment: rarely     Drug use: No        Medications:      azithromycin (ZITHROMAX Z-KARY) 250 MG tablet   ondansetron (ZOFRAN ODT) 4 MG ODT tab   norethindrone-ethinyl estradiol (MICROGESTIN ) 1-20 MG-MCG tablet         Review of Systems  Constitutional:  Negative for fever or chills.  Cardiovascular:  Negative for chest pain.  Respiratory: Positive for improving dry cough.  Negative for shortness of breath.  Gastrointestinal: For abdominal pain.  Denies blood with bowel movements.  Genitourinary:  Negative for dysuria or hematuria.  Neurological:  Negative for headache or dizziness.    All others reviewed and are negative.      Physical Exam   BP: 113/64  Pulse: 106  Temp: 98.5  F (36.9  C)  Resp: 18  Height: 157.5 cm (5' 2\")  Weight: 59 kg (130 lb)  SpO2: 100 %      Physical Exam  Constitutional:  Well developed, well nourished.  Appears nontoxic and in no acute distress.   HENT:  Normocephalic and atraumatic.  Symmetric in appearance.  Eyes:  Conjunctivae are normal.  Neck:  Neck supple.  Cardiovascular:  No cyanosis.  Borderline tachycardia with regular " rhythm.    Respiratory:  Effort normal without sign of respiratory distress.  CTAB without diminished regions.   Gastrointestinal:  Soft nondistended abdomen.  Nontender and without guarding.  No rigidity or rebound tenderness.  Negative Aguayo's sign.  Negative McBurney's point.    Genitourinary:  Noncontributory.   Musculoskeletal:  Moves extremities spontaneously.  Neurological:  Patient is alert.  Skin:  Skin is warm and dry.  Psychiatric:  Normal mood and affect.      ED Course        Procedures              Critical Care time:  none               Results for orders placed or performed during the hospital encounter of 03/18/19 (from the past 24 hour(s))   CBC with platelets differential   Result Value Ref Range    WBC 10.0 4.0 - 11.0 10e9/L    RBC Count 5.06 3.8 - 5.2 10e12/L    Hemoglobin 14.7 11.7 - 15.7 g/dL    Hematocrit 45.9 35.0 - 47.0 %    MCV 91 78 - 100 fl    MCH 29.1 26.5 - 33.0 pg    MCHC 32.0 31.5 - 36.5 g/dL    RDW 12.2 10.0 - 15.0 %    Platelet Count 283 150 - 450 10e9/L    Diff Method Automated Method     % Neutrophils 92.3 %    % Lymphocytes 2.4 %    % Monocytes 4.4 %    % Eosinophils 0.4 %    % Basophils 0.2 %    % Immature Granulocytes 0.3 %    Nucleated RBCs 0 0 /100    Absolute Neutrophil 9.3 (H) 1.6 - 8.3 10e9/L    Absolute Lymphocytes 0.2 (L) 0.8 - 5.3 10e9/L    Absolute Monocytes 0.4 0.0 - 1.3 10e9/L    Absolute Eosinophils 0.0 0.0 - 0.7 10e9/L    Absolute Basophils 0.0 0.0 - 0.2 10e9/L    Abs Immature Granulocytes 0.0 0 - 0.4 10e9/L    Absolute Nucleated RBC 0.0    Comprehensive metabolic panel   Result Value Ref Range    Sodium 138 133 - 144 mmol/L    Potassium 3.9 3.4 - 5.3 mmol/L    Chloride 105 94 - 109 mmol/L    Carbon Dioxide 29 20 - 32 mmol/L    Anion Gap 4 3 - 14 mmol/L    Glucose 131 (H) 70 - 99 mg/dL    Urea Nitrogen 16 7 - 30 mg/dL    Creatinine 0.74 0.52 - 1.04 mg/dL    GFR Estimate >90 >60 mL/min/[1.73_m2]    GFR Estimate If Black >90 >60 mL/min/[1.73_m2]    Calcium 9.1 8.5  - 10.1 mg/dL    Bilirubin Total 0.6 0.2 - 1.3 mg/dL    Albumin 4.2 3.4 - 5.0 g/dL    Protein Total 8.1 6.8 - 8.8 g/dL    Alkaline Phosphatase 57 40 - 150 U/L    ALT 35 0 - 50 U/L    AST 30 0 - 45 U/L   Lipase   Result Value Ref Range    Lipase 229 73 - 393 U/L       Medications   ondansetron (ZOFRAN) injection 8 mg (8 mg Intravenous Given 3/18/19 0213)   lactated ringers BOLUS 1,000 mL (0 mLs Intravenous Stopped 3/18/19 0317)   ketorolac (TORADOL) injection 15 mg (15 mg Intravenous Given 3/18/19 0252)       Assessments & Plan (with Medical Decision Making)   Leila Azul is a 43 year old female who presents to the department for evaluation of nausea, vomiting, and diarrhea.  Symptoms began acutely but not associated with fever or abdominal pain.  Differential diagnosis included ACS, pancreatitis, appendicitis, cholecystitis, PUD, SBO, inflammatory bowel, ectopic pregnancy and AAA.  Serial abdominal examinations remained benign without tenderness or guarding.  Afebrile and CBC without leukocytosis.  She has been without recent travel, atypical diet, or blood with bowel movements.  Clinical impression is that she is likely suffering from gastroenteritis of viral etiology.  Recommend oral hydration and continue monitoring for expected improvement.  She is in agreement discharge plan including return instructions for change or developing symptoms.      Disclaimer:  This note consists of symbols derived from keyboarding, dictation, and/or voice recognition software.  As a result, there may be errors in the script that have gone undetected.  Please consider this when interpreting information found in the chart.        I have reviewed the nursing notes.    I have reviewed the findings, diagnosis, plan and need for follow up with the patient.          Medication List      Started    ondansetron 4 MG ODT tab  Commonly known as:  ZOFRAN ODT  4-8 mg, Oral, EVERY 8 HOURS PRN            Final diagnoses:   Nausea, vomiting,  and diarrhea       3/18/2019   [facility] EMERGENCY DEPARTMENT     Peterson Vazquez,   03/18/19 0336

## 2019-07-08 ENCOUNTER — TELEPHONE (OUTPATIENT)
Dept: OBGYN | Facility: CLINIC | Age: 44
End: 2019-07-08

## 2019-07-08 DIAGNOSIS — N64.4 BREAST PAIN: Primary | ICD-10-CM

## 2019-07-08 NOTE — TELEPHONE ENCOUNTER
Reason for Call:  Other orders    Detailed comments: Pt states she needs a more diagnostic mammogram, still has lump in arm pit but more tender now, please call pt to advise    Phone Number Patient can be reached at: Home number on file [phone] (home)    Best Time: today    Can we leave a detailed message on this number? YES    Call taken on 7/8/2019 at 2:43 PM by Laura Morrow

## 2019-07-08 NOTE — TELEPHONE ENCOUNTER
OK to order diagnostic mammogram with ultrasound?    Last mammogram with US was in 2017.    Thanks.    Meghan Herman   [facility]  RN

## 2019-07-09 NOTE — TELEPHONE ENCOUNTER
Yes   David Gayle     Orders placed.  Patient notified.  She will call for scheduling.    Meghan Herman   [facility]  RN

## 2019-07-26 ENCOUNTER — HOSPITAL ENCOUNTER (OUTPATIENT)
Dept: MAMMOGRAPHY | Facility: CLINIC | Age: 44
Discharge: HOME | End: 2019-07-26
Attending: OBSTETRICS & GYNECOLOGY | Admitting: OBSTETRICS & GYNECOLOGY
Payer: COMMERCIAL

## 2019-07-26 ENCOUNTER — HOSPITAL ENCOUNTER (OUTPATIENT)
Dept: ULTRASOUND IMAGING | Facility: CLINIC | Age: 44
End: 2019-07-26
Attending: OBSTETRICS & GYNECOLOGY
Payer: COMMERCIAL

## 2019-07-26 DIAGNOSIS — N64.4 BREAST PAIN: ICD-10-CM

## 2019-07-26 PROCEDURE — 77066 DX MAMMO INCL CAD BI: CPT

## 2019-07-26 PROCEDURE — 76882 US LMTD JT/FCL EVL NVASC XTR: CPT | Mod: RT

## 2019-07-26 PROCEDURE — G0279 TOMOSYNTHESIS, MAMMO: HCPCS

## 2019-10-09 ENCOUNTER — OFFICE VISIT (OUTPATIENT)
Dept: FAMILY MEDICINE | Facility: CLINIC | Age: 44
End: 2019-10-09
Payer: COMMERCIAL

## 2019-10-09 VITALS
RESPIRATION RATE: 18 BRPM | OXYGEN SATURATION: 99 % | SYSTOLIC BLOOD PRESSURE: 96 MMHG | TEMPERATURE: 99 F | HEART RATE: 78 BPM | BODY MASS INDEX: 24.11 KG/M2 | HEIGHT: 62 IN | WEIGHT: 131 LBS | DIASTOLIC BLOOD PRESSURE: 58 MMHG

## 2019-10-09 DIAGNOSIS — H10.31 ACUTE CONJUNCTIVITIS OF RIGHT EYE, UNSPECIFIED ACUTE CONJUNCTIVITIS TYPE: Primary | ICD-10-CM

## 2019-10-09 PROBLEM — J18.9 CAP (COMMUNITY ACQUIRED PNEUMONIA): Status: RESOLVED | Noted: 2017-02-07 | Resolved: 2019-10-09

## 2019-10-09 PROBLEM — J10.1 INFLUENZA A: Status: RESOLVED | Noted: 2017-02-07 | Resolved: 2019-10-09

## 2019-10-09 PROCEDURE — 99213 OFFICE O/P EST LOW 20 MIN: CPT | Performed by: FAMILY MEDICINE

## 2019-10-09 RX ORDER — OFLOXACIN 3 MG/ML
1-2 SOLUTION/ DROPS OPHTHALMIC 4 TIMES DAILY
Qty: 5 ML | Refills: 0 | Status: SHIPPED | OUTPATIENT
Start: 2019-10-09 | End: 2021-09-30

## 2019-10-09 ASSESSMENT — MIFFLIN-ST. JEOR: SCORE: 1198.49

## 2019-10-09 ASSESSMENT — PAIN SCALES - GENERAL: PAINLEVEL: MILD PAIN (3)

## 2019-10-09 NOTE — PROGRESS NOTES
"Subjective     Leila Azul is a 43 year old female who presents to clinic today for the following health issues:    HPI   Eye(s) Problem  Onset: 3 days    Description:   Location: right  Pain: YES- tendeness  Redness: YES    Accompanying Signs & Symptoms:  Discharge/mattering: YES  Swelling: YES  Visual changes: no  Fever: no  Nasal Congestion: no  Bothered by bright lights: no    History:   Trauma: no   Foreign body exposure: no    Precipitating factors:   Wearing contacts: YES- she wears contacts but is not here    Alleviating factors:  Improved by: nothing    Therapies Tried and outcome: warm wash cloth      Leaving tomorrow for a work trip and then when she gets back from that will be going to [place] for 8 days.           Reviewed and updated as needed this visit by Provider         Review of Systems   ROS COMP: Constitutional, HEENT, cardiovascular, pulmonary, gi and gu systems are negative, except as otherwise noted.      Objective    BP 96/58   Pulse 78   Temp 99  F (37.2  C) (Tympanic)   Resp 18   Ht 1.568 m (5' 1.75\")   Wt 59.4 kg (131 lb)   SpO2 99%   Breastfeeding? No   BMI 24.15 kg/m    Body mass index is 24.15 kg/m .  Physical Exam   GENERAL: healthy, alert and no distress  EYES: mild conjunctival erythema of the right eye, increased tearing.  Otherwise unremarkable.             Assessment & Plan       ICD-10-CM    1. Acute conjunctivitis of right eye, unspecified acute conjunctivitis type H10.31 ofloxacin (OCUFLOX) 0.3 % ophthalmic solution     Antibiotic drops per order. Hygiene discussed.           No follow-ups on file.    Lili Tyson MD  [facility]      "

## 2019-10-09 NOTE — PATIENT INSTRUCTIONS
Our Clinic hours are:  Mondays    7:20 am - 7 pm  Tues -  Fri  7:20 am - 5 pm    Clinic Phone: [phone]    The clinic lab opens at 7:30 am Mon - Fri and appointments are required.    [facility]. [phone]  Monday  8 am - 7pm  Tues - Fri 8 am - 5:30 pm

## 2019-10-11 ENCOUNTER — TELEPHONE (OUTPATIENT)
Dept: FAMILY MEDICINE | Facility: CLINIC | Age: 44
End: 2019-10-11

## 2019-10-11 NOTE — TELEPHONE ENCOUNTER
Reason for call:  Patient reporting a symptom    Symptom or request: Eye and sinus pain    Duration (how long have symptoms been present): Monday, 10/7/19    Have you been treated for this before? Yes    Additional comments: Patient was diagnosed with possible conjuntivitis on Wed, 10/9/19 by Dr. Tyson and given eye drops, but pain is now in both eyes and sinuses. Wants antibiotic since she will not be in town hardly at all.Patient is now on [facility] [place], comes back [place] night,10/13/19 leaves Mon 10/4/19 afternoon for [facility].      Phone Number patient can be reached at:  Home number on file [phone] (home)    Best Time:  Any    Can we leave a detailed message on this number:  Yes, please leave her an extensive message on her phone    Call taken on 10/11/2019 at 12:38 PM by Isis Williamson

## 2019-10-11 NOTE — TELEPHONE ENCOUNTER
I have attempted to contact this patient by phone with the following results: left message to return my call on answering machine.    Priscilla Becerra RN

## 2019-10-11 NOTE — TELEPHONE ENCOUNTER
Patient states that she was treated for conjunctivitis in one eye on Wednesday, now both eyes are gooping up and burning. States the right eye is getting a little better with the drops so she started using the drops on the left eye as well.    She is now having symptoms of sinus pressure and pain, teeth pain, and sinus headaches. States this got worse on the airplane ride. She denies any nasal drainage or a fever. I did advise her to go onto [email or web address] to see if she can get something for these symptoms. She agreed with this plan.    Priscilla Becerra RN

## 2019-11-04 ENCOUNTER — HEALTH MAINTENANCE LETTER (OUTPATIENT)
Age: 44
End: 2019-11-04

## 2019-12-19 ENCOUNTER — IMMUNIZATION (OUTPATIENT)
Dept: FAMILY MEDICINE | Facility: CLINIC | Age: 44
End: 2019-12-19
Payer: COMMERCIAL

## 2019-12-19 PROCEDURE — 90471 IMMUNIZATION ADMIN: CPT

## 2019-12-19 PROCEDURE — 90686 IIV4 VACC NO PRSV 0.5 ML IM: CPT

## 2020-11-22 ENCOUNTER — HEALTH MAINTENANCE LETTER (OUTPATIENT)
Age: 45
End: 2020-11-22

## 2021-02-13 ENCOUNTER — HEALTH MAINTENANCE LETTER (OUTPATIENT)
Age: 46
End: 2021-02-13

## 2021-09-03 ENCOUNTER — OFFICE VISIT (OUTPATIENT)
Dept: FAMILY MEDICINE | Facility: CLINIC | Age: 46
End: 2021-09-03
Payer: COMMERCIAL

## 2021-09-03 VITALS
TEMPERATURE: 97.4 F | HEART RATE: 66 BPM | BODY MASS INDEX: 24.66 KG/M2 | HEIGHT: 62 IN | RESPIRATION RATE: 14 BRPM | WEIGHT: 134 LBS | OXYGEN SATURATION: 97 % | DIASTOLIC BLOOD PRESSURE: 66 MMHG | SYSTOLIC BLOOD PRESSURE: 102 MMHG

## 2021-09-03 DIAGNOSIS — Z12.31 ENCOUNTER FOR SCREENING MAMMOGRAM FOR BREAST CANCER: ICD-10-CM

## 2021-09-03 DIAGNOSIS — R10.9 FLANK PAIN: Primary | ICD-10-CM

## 2021-09-03 DIAGNOSIS — R14.0 ABDOMINAL BLOATING: ICD-10-CM

## 2021-09-03 DIAGNOSIS — R17 YELLOW EYES: ICD-10-CM

## 2021-09-03 LAB
ALBUMIN SERPL-MCNC: 3.9 G/DL (ref 3.4–5)
ALBUMIN UR-MCNC: NEGATIVE MG/DL
ALP SERPL-CCNC: 65 U/L (ref 40–150)
ALT SERPL W P-5'-P-CCNC: 31 U/L (ref 0–50)
ANION GAP SERPL CALCULATED.3IONS-SCNC: 3 MMOL/L (ref 3–14)
APPEARANCE UR: CLEAR
AST SERPL W P-5'-P-CCNC: 25 U/L (ref 0–45)
BASOPHILS # BLD AUTO: 0 10E3/UL (ref 0–0.2)
BASOPHILS NFR BLD AUTO: 0 %
BILIRUB SERPL-MCNC: 0.3 MG/DL (ref 0.2–1.3)
BILIRUB UR QL STRIP: NEGATIVE
BUN SERPL-MCNC: 10 MG/DL (ref 7–30)
CALCIUM SERPL-MCNC: 8.5 MG/DL (ref 8.5–10.1)
CHLORIDE BLD-SCNC: 107 MMOL/L (ref 94–109)
CO2 SERPL-SCNC: 29 MMOL/L (ref 20–32)
COLOR UR AUTO: YELLOW
CREAT SERPL-MCNC: 0.78 MG/DL (ref 0.52–1.04)
EOSINOPHIL # BLD AUTO: 0.1 10E3/UL (ref 0–0.7)
EOSINOPHIL NFR BLD AUTO: 2 %
ERYTHROCYTE [DISTWIDTH] IN BLOOD BY AUTOMATED COUNT: 12.6 % (ref 10–15)
GFR SERPL CREATININE-BSD FRML MDRD: >90 ML/MIN/1.73M2
GLUCOSE BLD-MCNC: 71 MG/DL (ref 70–99)
GLUCOSE UR STRIP-MCNC: NEGATIVE MG/DL
HCT VFR BLD AUTO: 41.1 % (ref 35–47)
HGB BLD-MCNC: 13.3 G/DL (ref 11.7–15.7)
HGB UR QL STRIP: ABNORMAL
KETONES UR STRIP-MCNC: NEGATIVE MG/DL
LEUKOCYTE ESTERASE UR QL STRIP: NEGATIVE
LYMPHOCYTES # BLD AUTO: 1.4 10E3/UL (ref 0.8–5.3)
LYMPHOCYTES NFR BLD AUTO: 21 %
MCH RBC QN AUTO: 29.3 PG (ref 26.5–33)
MCHC RBC AUTO-ENTMCNC: 32.4 G/DL (ref 31.5–36.5)
MCV RBC AUTO: 91 FL (ref 78–100)
MONOCYTES # BLD AUTO: 0.7 10E3/UL (ref 0–1.3)
MONOCYTES NFR BLD AUTO: 10 %
NEUTROPHILS # BLD AUTO: 4.4 10E3/UL (ref 1.6–8.3)
NEUTROPHILS NFR BLD AUTO: 66 %
NITRATE UR QL: NEGATIVE
PH UR STRIP: 6 [PH] (ref 5–7)
PLATELET # BLD AUTO: 256 10E3/UL (ref 150–450)
POTASSIUM BLD-SCNC: 4.4 MMOL/L (ref 3.4–5.3)
PROT SERPL-MCNC: 7.2 G/DL (ref 6.8–8.8)
RBC # BLD AUTO: 4.54 10E6/UL (ref 3.8–5.2)
RBC #/AREA URNS AUTO: ABNORMAL /HPF
SODIUM SERPL-SCNC: 139 MMOL/L (ref 133–144)
SP GR UR STRIP: 1.01 (ref 1–1.03)
SQUAMOUS #/AREA URNS AUTO: ABNORMAL /LPF
UROBILINOGEN UR STRIP-ACNC: 0.2 E.U./DL
WBC # BLD AUTO: 6.7 10E3/UL (ref 4–11)
WBC #/AREA URNS AUTO: ABNORMAL /HPF

## 2021-09-03 PROCEDURE — 81001 URINALYSIS AUTO W/SCOPE: CPT | Performed by: NURSE PRACTITIONER

## 2021-09-03 PROCEDURE — 85025 COMPLETE CBC W/AUTO DIFF WBC: CPT | Performed by: NURSE PRACTITIONER

## 2021-09-03 PROCEDURE — 36415 COLL VENOUS BLD VENIPUNCTURE: CPT | Performed by: NURSE PRACTITIONER

## 2021-09-03 PROCEDURE — 99214 OFFICE O/P EST MOD 30 MIN: CPT | Performed by: NURSE PRACTITIONER

## 2021-09-03 PROCEDURE — 80053 COMPREHEN METABOLIC PANEL: CPT | Performed by: NURSE PRACTITIONER

## 2021-09-03 ASSESSMENT — PAIN SCALES - GENERAL: PAINLEVEL: MILD PAIN (2)

## 2021-09-03 ASSESSMENT — MIFFLIN-ST. JEOR: SCORE: 1202.1

## 2021-09-03 NOTE — PROGRESS NOTES
Assessment & Plan     Flank pain  Etiology of pain unclear.  Urinalysis was within normal limits.  Additional laboratory evaluation will be completed as below.  - UA with Microscopic reflex to Culture - Clinic Collect  - Urine Microscopic    Encounter for screening mammogram for breast cancer  Screening mammography ordered today.  - *MA Screening Digital Bilateral; Future    Abdominal bloating  Increase symptoms of abdominal bloating present also on exam.  CMP and CBC checked today.  Ultrasound ordered for imaging of the right upper quadrant.  - Comprehensive metabolic panel (BMP + Alb, Alk Phos, ALT, AST, Total. Bili, TP); Future  - CBC with platelets and differential; Future  - US Abdomen Limited; Future  - Comprehensive metabolic panel (BMP + Alb, Alk Phos, ALT, AST, Total. Bili, TP)  - CBC with platelets and differential    Yellow eyes  Nonspecifically icteric today but patient reports that other people have expressed concern.  Labs completed as below ultrasound to be evaluated as well.  There was no bilirubin present in urine.   - Comprehensive metabolic panel (BMP + Alb, Alk Phos, ALT, AST, Total. Bili, TP); Future  - CBC with platelets and differential; Future  - US Abdomen Limited; Future  - Comprehensive metabolic panel (BMP + Alb, Alk Phos, ALT, AST, Total. Bili, TP)  - CBC with platelets and differential                 Return in about 4 weeks (around 10/1/2021) for ongoing symptoms if not improving, or sooner if symptoms fail to improve.    Ashley Bey, CATHERINE CNP  [facility]   Leila is a 45 year old who presents for the following health issues     HPI     Genitourinary - Female-pt has been having sharp cramps in pelvic area, flank pain on both sides, people have told her her eyes look yellow, belly feels full or bloated, lack of appetite, harder to breathe.  Onset/Duration: 2 + months  Description:   Painful urination (Dysuria): no           Frequency:  "no  Blood in urine (Hematuria): no  Delay in urine (Hesitency): no  Intensity: mild  Progression of Symptoms:  same and intermittent  Accompanying Signs & Symptoms:  Fever/chills: no  Flank pain: YES  Nausea and vomiting: no  Vaginal symptoms: none  Abdominal/Pelvic Pain: YES  History:   History of frequent UTI s: no  History of kidney stones: no  Sexually Active: YES  Possibility of pregnancy: No  Precipitating or alleviating factors: None  Therapies tried and outcome:  none     No specific changes in stool. She consumes at most 1 drink per week. No upper abdominal pain. She has felt some bloating. No specific weight changes. Had an early mensis this last month.         Review of Systems   Constitutional, HEENT, cardiovascular, pulmonary, gi and gu systems are negative, except as otherwise noted.      Objective    /66 (BP Location: Right arm, Patient Position: Chair, Cuff Size: Adult Regular)   Pulse 66   Temp 97.4  F (36.3  C) (Tympanic)   Resp 14   Ht 1.568 m (5' 1.75\")   Wt 60.8 kg (134 lb)   LMP 08/29/2021 (Exact Date)   SpO2 97%   Breastfeeding No   BMI 24.71 kg/m    Body mass index is 24.71 kg/m .     Physical Exam   GENERAL: healthy, alert and no distress.  Eyes are nonicteric. No specific jaundice noted.   NECK: no adenopathy, no asymmetry, masses, or scars and thyroid normal to palpation  RESP: lungs clear to auscultation - no rales, rhonchi or wheezes  CV: regular rate and rhythm, normal S1 S2, no S3 or S4, no murmur, click or rub, no peripheral edema and peripheral pulses strong  ABDOMEN: soft, nontender, no hepatosplenomegaly, no masses and bowel sounds normal  MS: no gross musculoskeletal defects noted, no edema  NEURO: Normal strength and tone, mentation intact and speech normal  BACK: no CVA tenderness, no paralumbar tenderness  PSYCH: mentation appears normal, affect normal/bright                "

## 2021-09-11 ENCOUNTER — HOSPITAL ENCOUNTER (OUTPATIENT)
Dept: ULTRASOUND IMAGING | Facility: CLINIC | Age: 46
Discharge: HOME | End: 2021-09-11
Attending: NURSE PRACTITIONER | Admitting: NURSE PRACTITIONER
Payer: COMMERCIAL

## 2021-09-11 DIAGNOSIS — R14.0 ABDOMINAL BLOATING: ICD-10-CM

## 2021-09-11 DIAGNOSIS — R17 YELLOW EYES: ICD-10-CM

## 2021-09-11 PROCEDURE — 76705 ECHO EXAM OF ABDOMEN: CPT

## 2021-09-18 ENCOUNTER — HEALTH MAINTENANCE LETTER (OUTPATIENT)
Age: 46
End: 2021-09-18

## 2021-09-30 ENCOUNTER — OFFICE VISIT (OUTPATIENT)
Dept: OBGYN | Facility: CLINIC | Age: 46
End: 2021-09-30
Payer: COMMERCIAL

## 2021-09-30 VITALS
DIASTOLIC BLOOD PRESSURE: 75 MMHG | HEART RATE: 73 BPM | WEIGHT: 135.2 LBS | SYSTOLIC BLOOD PRESSURE: 104 MMHG | HEIGHT: 62 IN | RESPIRATION RATE: 18 BRPM | TEMPERATURE: 98.5 F | BODY MASS INDEX: 24.88 KG/M2

## 2021-09-30 DIAGNOSIS — Z00.00 ROUTINE GENERAL MEDICAL EXAMINATION AT A HEALTH CARE FACILITY: Primary | ICD-10-CM

## 2021-09-30 PROCEDURE — 87624 HPV HI-RISK TYP POOLED RSLT: CPT | Performed by: OBSTETRICS & GYNECOLOGY

## 2021-09-30 PROCEDURE — 99396 PREV VISIT EST AGE 40-64: CPT | Performed by: OBSTETRICS & GYNECOLOGY

## 2021-09-30 PROCEDURE — G0145 SCR C/V CYTO,THINLAYER,RESCR: HCPCS | Performed by: OBSTETRICS & GYNECOLOGY

## 2021-09-30 ASSESSMENT — MIFFLIN-ST. JEOR: SCORE: 1207.54

## 2021-09-30 NOTE — PROGRESS NOTES
SUBJECTIVE:   CC: Leila Azul is an 45 year old woman who presents for preventive health visit.       Patient has been advised of split billing requirements and indicates understanding: yes  Healthy Habits:    Do you get at least three servings of calcium containing foods daily (dairy, green leafy vegetables, etc.)? yes    Amount of exercise or daily activities, outside of work: 5 day(s) per week    Problems taking medications regularly No    Medication side effects: No    Have you had an eye exam in the past two years? yes    Do you see a dentist twice per year? yes    Do you have sleep apnea, excessive snoring or daytime drowsiness?no          Today's PHQ-2 Score:   PHQ-2 ( 1999 Pf[employer]) 9/30/2021 9/3/2021   Q1: Little interest or pleasure in doing things 0 0   Q2: Feeling down, depressed or hopeless 0 0   PHQ-2 Score 0 0       Abuse: Current or Past(Physical, Sexual or Emotional)- No  Do you feel safe in your environment? Yes    Have you ever done Advance Care Planning? (For example, a Health Directive, POLST, or a discussion with a medical provider or your loved ones about your wishes): No, advance care planning information given to patient to review.  Patient plans to discuss their wishes with loved ones or provider.      Social History     Tobacco Use     Smoking status: Never Smoker     Smokeless tobacco: Never Used   Substance Use Topics     Alcohol use: Yes     Comment: rarely     If you drink alcohol do you typically have >3 drinks per day or >7 drinks per week? No                     Reviewed orders with patient.  Reviewed health maintenance and updated orders accordingly - Yes  BP Readings from Last 3 Encounters:   09/30/21 104/75   09/03/21 102/66   10/09/19 96/58    Wt Readings from Last 3 Encounters:   09/30/21 61.3 kg (135 lb 3.2 oz)   09/03/21 60.8 kg (134 lb)   10/09/19 59.4 kg (131 lb)                  Patient Active Problem List   Diagnosis     Hair loss     CARDIOVASCULAR SCREENING; LDL  GOAL LESS THAN 160     Alopecia     Benign neoplasm of skin of lower limb, including hip     Chest pain, unspecified     Cystocele, midline     Other tenosynovitis of hand and wrist     Tietze's disease     Past Surgical History:   Procedure Laterality Date     SURGICAL HISTORY OF -       eye surgery age 2       Social History     Tobacco Use     Smoking status: Never Smoker     Smokeless tobacco: Never Used   Substance Use Topics     Alcohol use: Yes     Comment: rarely     Family History   Problem Relation Age of Onset     Circulatory Mother         DVT OF THE LEG     Cerebrovascular Disease Maternal Grandfather      Cancer Paternal Grandmother         age 55, Breast Ca     Depression Paternal Grandmother      Gastrointestinal Disease Father         ulcerative colitis         Current Outpatient Medications   Medication Sig Dispense Refill     Multiple Vitamins-Minerals (DAILY MULTI PO)        Allergies   Allergen Reactions     Amoxicillin Rash     Trimethoprim Sulfate Other (See Comments)     Vomiting blood after taking medication.     Doxycycline Rash     Recent Labs   Lab Test 09/03/21  1554 03/18/19  0207 03/15/19  1435 03/15/19  1435 12/05/18  1646 06/29/18  0801 06/29/18  0801   ALT 31 35  --   --   --   --  24   CR 0.78 0.74   < > 0.68  --    < > 0.76   GFRESTIMATED >90 >90   < > >90  --    < > 83   GFRESTBLACK  --  >90  --  >90  --    < > >90   POTASSIUM 4.4 3.9   < > 4.7  --    < > 4.2   TSH  --   --   --   --  1.24  --  2.61    < > = values in this interval not displayed.        FHS-7: No flowsheet data found.    Mammogram Screening: Recommended annual mammography  Pertinent mammograms are reviewed under the imaging tab.    Pertinent mammograms are reviewed under the imaging tab.  History of abnormal Pap smear: NO - age 30- 65 PAP every 3 years recommended  PAP / HPV Latest Ref Rng & Units 6/1/2017 10/7/2013 2/11/2010   PAP (Historical) - NIL NIL NIL   HPV16 NEG Negative - -   HPV18 NEG Negative - -    HRHPV NEG Negative - -     Reviewed and updated as needed this visit by clinical staff  Tobacco  Allergies  Meds   Med Hx  Surg Hx  Fam Hx  Soc Hx        Reviewed and updated as needed this visit by Provider                Past Medical History:   Diagnosis Date     Anemia      increased blood loss after 1st delivery     Chickenpox       labor ,,    -bedrest/terbutaline wiith 1st two preg.      Past Surgical History:   Procedure Laterality Date     SURGICAL HISTORY OF -       eye surgery age 2     OB History    Para Term  AB Living   6 4 3 1 2 4   SAB TAB Ectopic Multiple Live Births   2 0 0 0 4      # Outcome Date GA Lbr John/2nd Weight Sex Delivery Anes PTL Lv   6 Term 14 39w1d 03:40 / 02:10 4.082 kg (9 lb) M Vag-Vacuum EPI N KRUNAL      Birth Comments: Passed  hearing and pulse oximetry screen.  Mom 38 year old A2, O (+), GBS, HIV, Hep B sAg negative, rubella immune, VDRL nonreactive.      Name: Max      Apgar1: 8  Apgar5: 8   5 SAB 2013 9w0d    SAB      4 Term 10/24/11 39w1d 05:20 / 00:25 3.83 kg (8 lb 7.1 oz) F Vag-Spont Spinal N KRUNAL      Birth Comments: HBV given in hosp  Passed  hearing screen bilaterally  Jaundice-tx with phototherapy for one day.   [facility]        Name: Florencia      Apgar1: 7  Apgar5: 9   3 Term 08 40w0d 06:00 3.572 kg (7 lb 14 oz) M    KRUNAL      Birth Comments:  labor      Name: Bridger Waddell  02 36w0d 01:00 2.948 kg (6 lb 8 oz) F IVD   KRUNAL      Birth Comments: ? hemorrhaged- anemia      Name: Brittany   1 1995 5w0d    SAB          ROS:  CONSTITUTIONAL: NEGATIVE for fever, chills, change in weight  INTEGUMENTARU/SKIN: NEGATIVE for worrisome rashes, moles or lesions  EYES: NEGATIVE for vision changes or irritation  RESP: NEGATIVE for significant cough or SOB  BREAST: NEGATIVE for masses, tenderness or discharge  CV: NEGATIVE for chest pain, palpitations or peripheral  "edema  GI: NEGATIVE for nausea, abdominal pain, heartburn, or change in bowel habits  : NEGATIVE for unusual urinary or vaginal symptoms. Periods are regular.  MUSCULOSKELETAL: NEGATIVE for significant arthralgias or myalgia  NEURO: NEGATIVE for weakness, dizziness or paresthesias  ENDOCRINE: NEGATIVE for temperature intolerance, skin/hair changes  HEME/ALLERGY/IMMUNE: NEGATIVE for bleeding problems  PSYCHIATRIC: NEGATIVE for changes in mood or affect    OBJECTIVE:   /75 (BP Location: Left arm, Cuff Size: Adult Regular)   Pulse 73   Temp 98.5  F (36.9  C)   Resp 18   Ht 1.568 m (5' 1.75\")   Wt 61.3 kg (135 lb 3.2 oz)   LMP 09/24/2021 (Approximate)   BMI 24.93 kg/m    EXAM:  GENERAL: healthy, alert and no distress  EYES: Eyes grossly normal to inspection, PERRL and conjunctivae and sclerae normal  NECK: no adenopathy, no asymmetry, masses, or scars and thyroid normal to palpation  RESP: lungs clear to auscultation - no rales, rhonchi or wheezes  CV: regular rate and rhythm, normal S1 S2, no S3 or S4, no murmur, click or rub, no peripheral edema and peripheral pulses strong  ABDOMEN: soft, nontender, no hepatosplenomegaly, no masses and bowel sounds normal   (female): normal female external genitalia, normal urethral meatus, vaginal mucosa pink, moist, well rugated, and normal cervix/adnexa/uterus without masses or discharge.  The uterus is retroverted the cervix elongated.  There is no evidence of prolapse  MS: no gross musculoskeletal defects noted, no edema  SKIN: no suspicious lesions or rashes  NEURO: Normal strength and tone, mentation intact and speech normal  PSYCH: mentation appears normal, affect normal/bright  LYMPH: no cervical, supraclavicular, axillary, or inguinal adenopathy        ASSESSMENT/PLAN:   (Z00.00) Routine general medical examination at a health care facility  (primary encounter diagnosis)  Comment: Discussed the findings of the right lobe liver hemangioma is a benign " "situation  Plan: Pap screen with HPV - recommended age 30 - 65         years, HPV Hold (Lab Only), HPV High Risk Types        DNA Cervical              Patient has been advised of split billing requirements and indicates understanding: Yes  COUNSELING:   Reviewed preventive health counseling, as reflected in patient instructions       Regular exercise       Healthy diet/nutrition    Estimated body mass index is 24.93 kg/m  as calculated from the following:    Height as of this encounter: 1.568 m (5' 1.75\").    Weight as of this encounter: 61.3 kg (135 lb 3.2 oz).        She reports that she has never smoked. She has never used smokeless tobacco.      Counseling Resources:  ATP IV Guidelines  Pooled Cohorts Equation Calculator  Breast Cancer Risk Calculator  BRCA-Related Cancer Risk Assessment: FHS-7 Tool  FRAX Risk Assessment  ICSI Preventive Guidelines  Dietary Guidelines for Americans, 2010  USDA's MyPlate  ASA Prophylaxis  Lung CA Screening    David Gayle MD  [facility] [place]  "

## 2021-10-04 LAB
BKR LAB AP GYN ADEQUACY: NORMAL
BKR LAB AP GYN INTERPRETATION: NORMAL
BKR LAB AP HPV REFLEX: NORMAL
BKR LAB AP LMP: NORMAL
BKR LAB AP PREVIOUS ABNORMAL: NORMAL
PATH REPORT.COMMENTS IMP SPEC: NORMAL
PATH REPORT.RELEVANT HX SPEC: NORMAL

## 2021-10-08 LAB
HUMAN PAPILLOMA VIRUS 16 DNA: NEGATIVE
HUMAN PAPILLOMA VIRUS 18 DNA: NEGATIVE
HUMAN PAPILLOMA VIRUS FINAL DIAGNOSIS: NORMAL
HUMAN PAPILLOMA VIRUS OTHER HR: NEGATIVE

## 2021-10-18 ENCOUNTER — HOSPITAL ENCOUNTER (OUTPATIENT)
Dept: MAMMOGRAPHY | Facility: CLINIC | Age: 46
Discharge: HOME | End: 2021-10-18
Attending: NURSE PRACTITIONER | Admitting: NURSE PRACTITIONER
Payer: COMMERCIAL

## 2021-10-18 DIAGNOSIS — Z12.31 ENCOUNTER FOR SCREENING MAMMOGRAM FOR BREAST CANCER: ICD-10-CM

## 2021-10-18 PROCEDURE — 77063 BREAST TOMOSYNTHESIS BI: CPT

## 2022-01-03 ENCOUNTER — OFFICE VISIT (OUTPATIENT)
Dept: FAMILY MEDICINE | Facility: CLINIC | Age: 47
End: 2022-01-03
Payer: COMMERCIAL

## 2022-01-03 VITALS
DIASTOLIC BLOOD PRESSURE: 70 MMHG | BODY MASS INDEX: 24.48 KG/M2 | HEART RATE: 70 BPM | SYSTOLIC BLOOD PRESSURE: 106 MMHG | WEIGHT: 133 LBS | RESPIRATION RATE: 18 BRPM | HEIGHT: 62 IN | TEMPERATURE: 98.2 F

## 2022-01-03 DIAGNOSIS — Z23 HIGH PRIORITY FOR 2019-NCOV VACCINE: ICD-10-CM

## 2022-01-03 DIAGNOSIS — R21 RASH AND NONSPECIFIC SKIN ERUPTION: Primary | ICD-10-CM

## 2022-01-03 PROCEDURE — 91306 COVID-19,PF,MODERNA (18+ YRS BOOSTER .25ML): CPT | Performed by: FAMILY MEDICINE

## 2022-01-03 PROCEDURE — 99213 OFFICE O/P EST LOW 20 MIN: CPT | Mod: 25 | Performed by: FAMILY MEDICINE

## 2022-01-03 PROCEDURE — 0064A COVID-19,PF,MODERNA (18+ YRS BOOSTER .25ML): CPT | Performed by: FAMILY MEDICINE

## 2022-01-03 RX ORDER — TRIAMCINOLONE ACETONIDE 1 MG/G
CREAM TOPICAL 2 TIMES DAILY
Qty: 15 G | Refills: 0 | Status: SHIPPED | OUTPATIENT
Start: 2022-01-03 | End: 2022-01-08

## 2022-01-03 ASSESSMENT — ENCOUNTER SYMPTOMS
CONSTIPATION: 0
PARESTHESIAS: 0
SORE THROAT: 0
NERVOUS/ANXIOUS: 0
MYALGIAS: 0
EYE PAIN: 0
HEARTBURN: 0
HEMATOCHEZIA: 0
CHILLS: 0
HEADACHES: 0
HEMATURIA: 0
BREAST MASS: 0
ARTHRALGIAS: 0
JOINT SWELLING: 0
DYSURIA: 0
PALPITATIONS: 0
DIARRHEA: 0
FREQUENCY: 0
SHORTNESS OF BREATH: 0
COUGH: 0
DIZZINESS: 0
FEVER: 0
ABDOMINAL PAIN: 0
WEAKNESS: 0
NAUSEA: 0

## 2022-01-03 ASSESSMENT — PAIN SCALES - GENERAL: PAINLEVEL: NO PAIN (0)

## 2022-01-03 ASSESSMENT — MIFFLIN-ST. JEOR: SCORE: 1192.56

## 2022-01-03 NOTE — PROGRESS NOTES
Assessment & Plan     Rash and nonspecific skin eruption  Possible reaction to new sweaters, trial of stroid creama nd continue usage of emolient. Will continue to monitor. Follow up if worsening or does not resolve.   - triamcinolone (KENALOG) 0.1 % external cream  Dispense: 15 g; Refill: 0    High priority for 2019-nCoV vaccine  - COVID-19,PF,MODERNA (18+ Yrs BOOSTER .25mL)       Set up nurse visit for your influenza vaccine    No follow-ups on file.    FABIANA ZAMORANO [facility]    Elvira Dee is a 46 year old who presents for the following health issues     HPI     Rash  Onset/Duration: 1.5 weeks   Description  Location: chest, collar bone   Character: round, blotchy, red  Itching: moderate  Intensity:  moderate  Progression of Symptoms:  Worsening- spreading   Accompanying signs and symptoms:   Fever: no  Body aches or joint pain: no  Sore throat symptoms: no  Recent cold symptoms: no  History:           Previous episodes of similar rash: None  New exposures:  None  Recent travel: no  Exposure to similar rash: no  Precipitating or alleviating factors:   Therapies tried and outcome: miconazole cream? , eczema lotion     Rash is itchy, initially tried [employer] eczema cream then an antibiotic ointment. Feels after starting antibiotic ointment it has worsened. Denies new exposures recently.     Patient works in [employer]-due for COVID moderna booster. Due for influenza vaccine.    Review of Systems   Constitutional: Negative for chills and fever.   HENT: Negative for congestion, ear pain, hearing loss and sore throat.    Eyes: Negative for pain and visual disturbance.   Respiratory: Negative for cough and shortness of breath.    Cardiovascular: Negative for chest pain, palpitations and peripheral edema.   Gastrointestinal: Negative for abdominal pain, constipation, diarrhea, heartburn, hematochezia and nausea.   Breasts:  Negative for tenderness, breast mass and discharge.  "  Genitourinary: Negative for dysuria, frequency, genital sores, hematuria, pelvic pain, urgency, vaginal bleeding and vaginal discharge.   Musculoskeletal: Negative for arthralgias, joint swelling and myalgias.   Skin: Negative for rash.   Neurological: Negative for dizziness, weakness, headaches and paresthesias.   Psychiatric/Behavioral: Negative for mood changes. The patient is not nervous/anxious.           Objective    /70 (Cuff Size: Adult Regular)   Pulse 70   Temp 98.2  F (36.8  C) (Tympanic)   Resp 18   Ht 1.568 m (5' 1.75\")   Wt 60.3 kg (133 lb)   LMP 12/20/2021   Breastfeeding No   BMI 24.52 kg/m    Body mass index is 24.52 kg/m .  Physical Exam  Constitutional:       General: She is not in acute distress.     Appearance: Normal appearance.   HENT:      Head: Normocephalic.   Eyes:      Extraocular Movements: Extraocular movements intact.   Pulmonary:      Effort: Pulmonary effort is normal.   Musculoskeletal:      Cervical back: Normal range of motion.   Skin:     General: Skin is warm and dry.      Comments: 5cm x 5cm patch over chest, non erythematous     Neurological:      Mental Status: She is alert.   Psychiatric:         Mood and Affect: Mood normal.                      "

## 2022-01-03 NOTE — NURSING NOTE
"Chief Complaint   Patient presents with     Derm Problem     /70 (Cuff Size: Adult Regular)   Pulse 70   Temp 98.2  F (36.8  C) (Tympanic)   Resp 18   Ht 1.568 m (5' 1.75\")   Wt 60.3 kg (133 lb)   LMP 12/20/2021   Breastfeeding No   BMI 24.52 kg/m   Estimated body mass index is 24.52 kg/m  as calculated from the following:    Height as of this encounter: 1.568 m (5' 1.75\").    Weight as of this encounter: 60.3 kg (133 lb).  Patient presents to the clinic using No DME      Health Maintenance that is potentially due pending provider review:    Health Maintenance Due   Topic Date Due     ANNUAL REVIEW OF HM ORDERS  Never done     HEPATITIS C SCREENING  Never done     LIPID  12/17/2020     COVID-19 Vaccine (3 - Booster for Moderna series) 07/28/2021     INFLUENZA VACCINE (1) 09/01/2021                "

## 2022-08-02 ENCOUNTER — TELEPHONE (OUTPATIENT)
Dept: FAMILY MEDICINE | Facility: CLINIC | Age: 47
End: 2022-08-02

## 2022-08-02 ENCOUNTER — OFFICE VISIT (OUTPATIENT)
Dept: URGENT CARE | Facility: URGENT CARE | Age: 47
End: 2022-08-02
Payer: COMMERCIAL

## 2022-08-02 VITALS
TEMPERATURE: 98.7 F | OXYGEN SATURATION: 99 % | SYSTOLIC BLOOD PRESSURE: 113 MMHG | WEIGHT: 132 LBS | BODY MASS INDEX: 24.34 KG/M2 | DIASTOLIC BLOOD PRESSURE: 64 MMHG | HEART RATE: 89 BPM
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
## 2022-08-02 DIAGNOSIS — M54.50 ACUTE MIDLINE LOW BACK PAIN WITHOUT SCIATICA: ICD-10-CM

## 2022-08-02 DIAGNOSIS — S13.4XXA WHIPLASH INJURIES, INITIAL ENCOUNTER: Primary | ICD-10-CM

## 2022-08-02 PROCEDURE — 99213 OFFICE O/P EST LOW 20 MIN: CPT | Performed by: PHYSICIAN ASSISTANT
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
## 2022-08-04 ENCOUNTER — APPOINTMENT (OUTPATIENT)
Dept: MRI IMAGING | Facility: CLINIC | Age: 47
End: 2022-08-04
Attending: EMERGENCY MEDICINE
Payer: COMMERCIAL

## 2022-08-04 ENCOUNTER — HOSPITAL ENCOUNTER (EMERGENCY)
Facility: CLINIC | Age: 47
Discharge: HOME | End: 2022-08-04
Attending: EMERGENCY MEDICINE | Admitting: EMERGENCY MEDICINE
Payer: COMMERCIAL

## 2022-08-04 VITALS
RESPIRATION RATE: 19 BRPM | OXYGEN SATURATION: 99 % | TEMPERATURE: 98.3 F | DIASTOLIC BLOOD PRESSURE: 69 MMHG | SYSTOLIC BLOOD PRESSURE: 106 MMHG | HEART RATE: 67 BPM
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
## 2022-08-04 DIAGNOSIS — S06.0X0A CONCUSSION WITHOUT LOSS OF CONSCIOUSNESS, INITIAL ENCOUNTER: ICD-10-CM

## 2022-08-04 PROCEDURE — 99285 EMERGENCY DEPT VISIT HI MDM: CPT | Mod: 25 | Performed by: EMERGENCY MEDICINE

## 2022-08-04 PROCEDURE — A9585 GADOBUTROL INJECTION: HCPCS | Performed by: EMERGENCY MEDICINE

## 2022-08-04 PROCEDURE — 96360 HYDRATION IV INFUSION INIT: CPT | Mod: 59 | Performed by: EMERGENCY MEDICINE

## 2022-08-04 PROCEDURE — 258N000003 HC RX IP 258 OP 636: Performed by: EMERGENCY MEDICINE

## 2022-08-04 PROCEDURE — 70544 MR ANGIOGRAPHY HEAD W/O DYE: CPT

## 2022-08-04 PROCEDURE — 70549 MR ANGIOGRAPH NECK W/O&W/DYE: CPT

## 2022-08-04 PROCEDURE — 70553 MRI BRAIN STEM W/O & W/DYE: CPT

## 2022-08-04 PROCEDURE — 255N000002 HC RX 255 OP 636: Performed by: EMERGENCY MEDICINE

## 2022-08-04 RX ORDER — GADOBUTROL 604.72 MG/ML
10 INJECTION INTRAVENOUS ONCE
Status: COMPLETED | OUTPATIENT
Start: 2022-08-04 | End: 2022-08-04

## 2022-08-04 RX ADMIN — GADOBUTROL 10 ML: 604.72 INJECTION INTRAVENOUS at 16:15

## 2022-08-04 RX ADMIN — SODIUM CHLORIDE 50 ML: 9 INJECTION, SOLUTION INTRAVENOUS at 16:15
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
## 2022-11-20 ENCOUNTER — HEALTH MAINTENANCE LETTER (OUTPATIENT)
Age: 47
End: 2022-11-20

## 2022-12-02 ENCOUNTER — TELEPHONE (OUTPATIENT)
Dept: FAMILY MEDICINE | Facility: CLINIC | Age: 47
End: 2022-12-02
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
## 2022-12-05 ENCOUNTER — ANCILLARY PROCEDURE (OUTPATIENT)
Dept: GENERAL RADIOLOGY | Facility: CLINIC | Age: 47
End: 2022-12-05
Attending: FAMILY MEDICINE
Payer: COMMERCIAL

## 2022-12-05 ENCOUNTER — OFFICE VISIT (OUTPATIENT)
Dept: FAMILY MEDICINE | Facility: CLINIC | Age: 47
End: 2022-12-05
Payer: COMMERCIAL

## 2022-12-05 VITALS
HEART RATE: 78 BPM | HEIGHT: 62 IN | WEIGHT: 137.8 LBS | RESPIRATION RATE: 18 BRPM | SYSTOLIC BLOOD PRESSURE: 108 MMHG | BODY MASS INDEX: 25.36 KG/M2 | TEMPERATURE: 98.8 F | DIASTOLIC BLOOD PRESSURE: 62 MMHG | OXYGEN SATURATION: 98 %

## 2022-12-05 DIAGNOSIS — M54.50 CHRONIC MIDLINE LOW BACK PAIN WITHOUT SCIATICA: Primary | ICD-10-CM

## 2022-12-05 DIAGNOSIS — G89.29 CHRONIC MIDLINE LOW BACK PAIN WITHOUT SCIATICA: ICD-10-CM
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
## 2022-12-05 DIAGNOSIS — G89.29 CHRONIC MIDLINE LOW BACK PAIN WITHOUT SCIATICA: Primary | ICD-10-CM

## 2022-12-05 DIAGNOSIS — M54.50 CHRONIC MIDLINE LOW BACK PAIN WITHOUT SCIATICA: ICD-10-CM

## 2022-12-05 PROCEDURE — 72100 X-RAY EXAM L-S SPINE 2/3 VWS: CPT | Mod: TC | Performed by: RADIOLOGY

## 2022-12-05 PROCEDURE — 99214 OFFICE O/P EST MOD 30 MIN: CPT | Performed by: FAMILY MEDICINE

## 2022-12-05 ASSESSMENT — ENCOUNTER SYMPTOMS: BACK PAIN: 1

## 2022-12-05 ASSESSMENT — PAIN SCALES - GENERAL: PAINLEVEL: NO PAIN (0)
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
## 2022-12-23 ENCOUNTER — HOSPITAL ENCOUNTER (OUTPATIENT)
Dept: GENERAL RADIOLOGY | Facility: CLINIC | Age: 47
Discharge: HOME | End: 2022-12-23
Attending: FAMILY MEDICINE | Admitting: FAMILY MEDICINE
Payer: COMMERCIAL

## 2022-12-23 ENCOUNTER — E-VISIT (OUTPATIENT)
Dept: FAMILY MEDICINE | Facility: CLINIC | Age: 47
End: 2022-12-23
Payer: COMMERCIAL

## 2022-12-23 ENCOUNTER — LAB (OUTPATIENT)
Dept: FAMILY MEDICINE | Facility: CLINIC | Age: 47
End: 2022-12-23
Attending: FAMILY MEDICINE
Payer: COMMERCIAL

## 2022-12-23 DIAGNOSIS — J20.9 ACUTE BRONCHITIS, UNSPECIFIED ORGANISM: ICD-10-CM

## 2022-12-23 DIAGNOSIS — J11.1 INFLUENZA-LIKE ILLNESS: ICD-10-CM

## 2022-12-23 DIAGNOSIS — J11.1 INFLUENZA-LIKE ILLNESS: Primary | ICD-10-CM

## 2022-12-23 LAB
FLUAV AG SPEC QL IA: NEGATIVE
FLUBV AG SPEC QL IA: NEGATIVE

## 2022-12-23 PROCEDURE — U0005 INFEC AGEN DETEC AMPLI PROBE: HCPCS

## 2022-12-23 PROCEDURE — U0003 INFECTIOUS AGENT DETECTION BY NUCLEIC ACID (DNA OR RNA); SEVERE ACUTE RESPIRATORY SYNDROME CORONAVIRUS 2 (SARS-COV-2) (CORONAVIRUS DISEASE [COVID-19]), AMPLIFIED PROBE TECHNIQUE, MAKING USE OF HIGH THROUGHPUT TECHNOLOGIES AS DESCRIBED BY CMS-2020-01-R: HCPCS

## 2022-12-23 PROCEDURE — 71046 X-RAY EXAM CHEST 2 VIEWS: CPT

## 2022-12-23 PROCEDURE — 87804 INFLUENZA ASSAY W/OPTIC: CPT

## 2022-12-23 PROCEDURE — 99421 OL DIG E/M SVC 5-10 MIN: CPT | Performed by: FAMILY MEDICINE

## 2022-12-23 RX ORDER — AZITHROMYCIN 250 MG/1
TABLET, FILM COATED ORAL
Qty: 6 TABLET | Refills: 0 | Status: SHIPPED | OUTPATIENT
Start: 2022-12-23 | End: 2022-12-28

## 2022-12-23 RX ORDER — ALBUTEROL SULFATE 90 UG/1
2 AEROSOL, METERED RESPIRATORY (INHALATION) 4 TIMES DAILY
Qty: 18 G | Refills: 0 | Status: SHIPPED | OUTPATIENT
Start: 2022-12-23 | End: 2023-03-21

## 2022-12-23 NOTE — ADDENDUM NOTE
NURSE ANTICOAGULATION PHONE CONTACT    Briseyda Werner 1967 contacted by phone today for 2 week INR results  No outpatient prescriptions have been marked as taking for the 1/17/18 encounter (Telephone) with Giovany Sánchez MD.       INR (no units)   Date Value   01/04/2018 5.1 ()     INR CAPILLARY (no units)   Date Value   01/17/2018 1.7     GOAL RANGE: 2.0-3.0    ALLERGIES:   Allergen Reactions   • Ceftriaxone Other (See Comments)     Developed leukopenia on long term treatment (4 weeks)       Patient Active Problem List   Diagnosis   • Osteoporosis   • Mental retardation   • Hematuria   • Hyperlipidemia   • Herpes simplex   • Elevated troponin   • Sepsis   • Generalized weakness   • Anemia   • Cardiomegaly   • Acute and subacute bacterial endocarditis   • Bicuspid aortic valve   • Aortic valve regurgitation   • Hospital-acquired pneumonia   • Hypoxia   • History of aortic valve replacement   • Long term (current) use of anticoagulants   • Encounter for therapeutic drug monitoring       PATIENT REPORTED CHANGES: No changes with medications/diet or concerns currently per staff.      NURSE DOSING ADJUSTMENTS AND EDUCATION: Will take 7.5mg 1/17 then increase regular warfarin dosing..          Patient will recheck INR in 2 weeks,sooner if changes or concerns develop.  Warfarin management done via phone according to protocol.  Reviewed signs and symptoms of bleeding and clotting with patient.  Reviewed and reinforced with patient the importance of calling the clinic with any medication, diet, and health related changes. Education on importance of adherence to consistent diet in Vitamin K and ETOH also discussed.   Kay Shoemaker RN     Addended by: SUNNY HERNANDEZ on: 12/23/2022 07:59 AM     Modules accepted: Orders

## 2022-12-24 ENCOUNTER — HEALTH MAINTENANCE LETTER (OUTPATIENT)
Age: 47
End: 2022-12-24

## 2022-12-24 LAB — SARS-COV-2 RNA RESP QL NAA+PROBE: NEGATIVE

## 2023-01-25 ENCOUNTER — TELEPHONE (OUTPATIENT)
Dept: OBGYN | Facility: CLINIC | Age: 48
End: 2023-01-25
Payer: COMMERCIAL

## 2023-01-25 NOTE — LETTER
2023      Leila Azul  [address] [place] [address]              Dear Leila,    To ensure we are providing the best quality care, we have reviewed your chart and see that you are due for:    Colon Cancer Screening:    If you have received a referral to schedule a Colonoscopy or choose to do a Colonoscopy instead of the FIT/ Cologuard test, please call [phone] to schedule.    If you have received a FIT test kit from a prior office visit, please check the expiration date. If , please get a new kit from the Lab/ Clinic. If not , please complete the test and mail in as soon as you are able.    If you would prefer to complete a Cologuard test, please reach out to your provider to see if this is an option for you.    You may call Dept: [phone] if you have any questions. If you have completed the tests outside of [facility], please have the results forwarded to our office Fax # [phone]. We will update the chart for your primary Physician to review before your next annual physical.            Sincerely,      David Gayle MD

## 2023-01-25 NOTE — TELEPHONE ENCOUNTER
Panel Management Review    Health Maintenance List    Health Maintenance   Topic Date Due     COLORECTAL CANCER SCREENING  Never done     HEPATITIS C SCREENING  Never done     HEPATITIS B IMMUNIZATION (3 of 3 - 19+ 3-dose series) 05/09/2000     LIPID  12/17/2020     COVID-19 Vaccine (4 - Booster for Moderna series) 02/28/2022     INFLUENZA VACCINE (1) 09/01/2022     YEARLY PREVENTIVE VISIT  09/30/2022     MAMMO SCREENING  10/18/2022     PHQ-2 (once per calendar year)  01/01/2023     DTAP/TDAP/TD IMMUNIZATION (3 - Td or Tdap) 04/08/2024     HPV TEST  09/30/2026     PAP  09/30/2026     ADVANCE CARE PLANNING  10/12/2026     HIV SCREENING  Completed     Pneumococcal Vaccine: Pediatrics (0 to 5 Years) and At-Risk Patients (6 to 64 Years)  Aged Out     IPV IMMUNIZATION  Aged Out     MENINGITIS IMMUNIZATION  Aged Out       Composite cancer screening  Chart review shows that this patient is due/due soon for the following Colonoscopy  Lab Results   Component Value Date    PAP NIL 06/01/2017     Past Surgical History:   Procedure Laterality Date     SURGICAL HISTORY OF -       eye surgery age 2       Is hysterectomy listed in surgical history? No   Is mastectomy listed in surgical history? No     Summary:    Patient is due/failing the following:   Colonoscopy    Action needed: Patient needs office visit for colon cancer screening.    Type of outreach:  Sent [employer] message.      Staff Signature:  CASSI PINA MA

## 2023-02-28 ENCOUNTER — OFFICE VISIT (OUTPATIENT)
Dept: URGENT CARE | Facility: URGENT CARE | Age: 48
End: 2023-02-28
Payer: COMMERCIAL

## 2023-02-28 VITALS
BODY MASS INDEX: 24.69 KG/M2 | HEART RATE: 68 BPM | TEMPERATURE: 98.9 F | SYSTOLIC BLOOD PRESSURE: 115 MMHG | WEIGHT: 135 LBS | DIASTOLIC BLOOD PRESSURE: 70 MMHG | OXYGEN SATURATION: 100 %

## 2023-02-28 DIAGNOSIS — I49.3 FREQUENT PVCS: Primary | ICD-10-CM

## 2023-02-28 PROCEDURE — 93000 ELECTROCARDIOGRAM COMPLETE: CPT

## 2023-02-28 PROCEDURE — 99213 OFFICE O/P EST LOW 20 MIN: CPT

## 2023-02-28 NOTE — PROGRESS NOTES
"URGENT CARE  Assessment & Plan   Assessment:   Leila Azul is a 47 year old female who's clinical presentation today is consistent with:   1. Frequent PVCs  - EKG 12-lead complete w/read - Clinics  - Primary Care Referral; Future    No alarm signs or symptoms present   Differential Diagnoses for this patient's CC include    ACS, stable angina, coagulation    Viral vs bacterial URI    Dehydration, GERD, musculoskeletal,    Anxiety, pleural effusion, pneumonia,   pulmonary embolism, pericarditis    Plan:  Originally suspicious for a cardiac etiology of patient's \"heart palpations\" and reports of fast heart beat, and as such, patient was worked up with an EKG, which was reassuring only showing frequent premature ventricular beats, and thus an acute ACS event was less of a concern and we focused on other potential differentials as her cause for the symptoms she was experiencing, including: Dehydration, anemia, anxiety, GERD or an infectious pathology.   Educated patient at this time I think it is okay to return to normal activity; I recommend she follow-up with a PCP in the next week for lab work including electrolytes and hemoglobin along with possibly a Holter monitor for a week.  Additionally we discussed if symptoms do not improve after starting today's treatment (or if symptoms worsen) to follow up in 2-3 days.  Provided reassurance to patient . I did go through my considerations with the patient today. I let them know there is little suspicion of a cardiac pathology, however it can not be ruled out indefinitely and as such should she still be concerned for cardiac or coagulopathy as the case of her heart palpations she should report to the ED immediatly       Patient is agreeable to treatment plan and state they will follow-up if symptoms do not improve and/or if symptoms worsen (see patient's AVS 'monitor for' section for specific patient instructions given and discussed regarding what to watch for and when " "to follow up)    Medications ordered are listed above, please see AVS for patient's specific and personalized discharge instructions given     CATHERINE Swartz CNP  [facility]      ______________________________________________________________________        Subjective  Subjective     HPI: Leila Azul  is a 47 year old  female who presents today for evaluation the following concerns:   Patient presents today endorsing she experienced elevated heart rate in the 200s 2 days ago on 2/26/2023.  Patient states the incident lasted less than 1 to 2 minutes and was self resolving.  Patient states she was not doing anything of high activity or stress.  Patient states she was just folding laundry in her home when she noticed her \"heart beating very fast \".  Patient denies any difficulty breathing, shortness of breath.  Patient denies any chest pain or pressure.  Patient denies any pain that radiates to her left arm.  Patient denies any numbness or tingling.  Patient denies any loss of consciousness or presyncope.  Patient denies any family history of cardiac conditions or coagulopathy conditions.  From patient denies any hypertension or blood clots in her history.    Allergies   Allergen Reactions     Amoxicillin Rash     Trimethoprim Sulfate Other (See Comments)     Vomiting blood after taking medication.     Doxycycline Rash     Patient Active Problem List   Diagnosis     Hair loss     CARDIOVASCULAR SCREENING; LDL GOAL LESS THAN 160     Alopecia     Benign neoplasm of skin of lower limb, including hip     Chest pain, unspecified     Cystocele, midline     Other tenosynovitis of hand and wrist     Tietze's disease       Review of Systems:  Pertinent review of systems as reflected in HPI, otherwise negative.     Objective  Objective    Physical Exam:  Vitals:    02/28/23 1359   BP: 115/70   Pulse: 68   Temp: 98.9  F (37.2  C)   TempSrc: Tympanic   SpO2: 100%   Weight: 61.2 kg (135 lb)    "     General: Alert and oriented, no acute distress, Vital signs reviewed: afebrile,  Normotensive, non illappearing   Psy/mental status: Cooperative, nonanxious  SKIN: Intact, no rashes  EYES: EOMs intact, PERRLA bilaterally   Conjunctiva: Clear bilaterally, no injection or erythema present  EARS: TMs intact, translucent gray in color with normal landmarks present no erythema  or bulging tympanic membrane   Canals are without swelling, however have a mild amount of cerumen, no impaction  NOSE:  mucosa moist     MOUTH/THROAT: lips, tongue, & oral mucosa appear normal upon inspection                Posterior oropharynx is erythematous but without exudate, lesions or tonsillar  Edema, no dysphonia   NECK: supple, has full range of motion with no meningeal signs              No lymphadenopathy present  LUNG: normal work of breathing, good respiratory effort without retractions, good air  movement, non labored, inspection reveals normal chest expansion w/  inspiration            Lung sounds are clear to auscultation bilaterally,            No rales/rhonic/crackles wheezing noted           No cough noted    CVS: S1, S2 normal, no murmur, click, rub or gallop, regular rate and rhythm, chest is clear without rales or wheezing, no pedal edema, no JVD, no hepatosplenomegaly.    Imaging:   All images were personally read by this provider (myself).   Per my independent interpretation the EKG shows SR with occasional PVCs    I explained my diagnostic considerations and recommendations to the patient, who voiced understanding and agreement with the treatment plan.   All questions were answered.   We discussed potential side effects, risks and benefits of any prescribed or recommended therapies, as well as expectations for response to treatments.  Please see AVS for any patient instructions & handouts given.   Patient was advised to contact the Nurse Care Line, their Primary Care provider, Urgent Care, or the Emergency Department  if there are new or worsening symptoms, or call 911 for emergencies.        ______________________________________________________________________      Patient Instructions   Diagnosis: premature ventricle contractions   Today we did:  EKG - showed extra ventricular beats     Plan:     Follow up with PCP   o Assess reason/ cause   - Dehydration, anemia, anxiety   - GERD, infectious pathology   o Lab work, electrolytes and hgb   o Holter monitor for week      Monitor for:     Chest pain    Lightheadedness     Fainting     Shortness of breath

## 2023-02-28 NOTE — PATIENT INSTRUCTIONS
Diagnosis: premature ventricle contractions   Today we did:  EKG - showed extra ventricular beats     Plan:   Follow up with PCP   Assess reason/ cause   Dehydration, anemia, anxiety   GERD, infectious pathology   Lab work, electrolytes and hgb   Holter monitor for week      Monitor for:   Chest pain  Lightheadedness   Fainting   Shortness of breath

## 2023-03-03 ENCOUNTER — OFFICE VISIT (OUTPATIENT)
Dept: FAMILY MEDICINE | Facility: CLINIC | Age: 48
End: 2023-03-03
Payer: COMMERCIAL

## 2023-03-03 VITALS
DIASTOLIC BLOOD PRESSURE: 78 MMHG | TEMPERATURE: 97.7 F | WEIGHT: 134.2 LBS | HEART RATE: 74 BPM | SYSTOLIC BLOOD PRESSURE: 110 MMHG | RESPIRATION RATE: 16 BRPM | HEIGHT: 62 IN | OXYGEN SATURATION: 100 % | BODY MASS INDEX: 24.69 KG/M2

## 2023-03-03 DIAGNOSIS — Z12.31 VISIT FOR SCREENING MAMMOGRAM: ICD-10-CM

## 2023-03-03 DIAGNOSIS — R00.2 PALPITATIONS: Primary | ICD-10-CM

## 2023-03-03 DIAGNOSIS — Z12.11 SCREEN FOR COLON CANCER: ICD-10-CM

## 2023-03-03 DIAGNOSIS — Z13.220 SCREENING FOR HYPERLIPIDEMIA: ICD-10-CM

## 2023-03-03 LAB
ALBUMIN SERPL BCG-MCNC: 4.5 G/DL (ref 3.5–5.2)
ALP SERPL-CCNC: 60 U/L (ref 35–104)
ALT SERPL W P-5'-P-CCNC: 19 U/L (ref 10–35)
ANION GAP SERPL CALCULATED.3IONS-SCNC: 10 MMOL/L (ref 7–15)
AST SERPL W P-5'-P-CCNC: 25 U/L (ref 10–35)
BASOPHILS # BLD AUTO: 0 10E3/UL (ref 0–0.2)
BASOPHILS NFR BLD AUTO: 1 %
BILIRUB SERPL-MCNC: 0.4 MG/DL
BUN SERPL-MCNC: 12.7 MG/DL (ref 6–20)
CALCIUM SERPL-MCNC: 9.4 MG/DL (ref 8.6–10)
CHLORIDE SERPL-SCNC: 103 MMOL/L (ref 98–107)
CREAT SERPL-MCNC: 0.84 MG/DL (ref 0.51–0.95)
DEPRECATED HCO3 PLAS-SCNC: 25 MMOL/L (ref 22–29)
EOSINOPHIL # BLD AUTO: 0.1 10E3/UL (ref 0–0.7)
EOSINOPHIL NFR BLD AUTO: 2 %
ERYTHROCYTE [DISTWIDTH] IN BLOOD BY AUTOMATED COUNT: 12.4 % (ref 10–15)
GFR SERPL CREATININE-BSD FRML MDRD: 86 ML/MIN/1.73M2
GLUCOSE SERPL-MCNC: 84 MG/DL (ref 70–99)
HCT VFR BLD AUTO: 42.1 % (ref 35–47)
HGB BLD-MCNC: 13.6 G/DL (ref 11.7–15.7)
IMM GRANULOCYTES # BLD: 0 10E3/UL
IMM GRANULOCYTES NFR BLD: 0 %
LYMPHOCYTES # BLD AUTO: 1.1 10E3/UL (ref 0.8–5.3)
LYMPHOCYTES NFR BLD AUTO: 21 %
MAGNESIUM SERPL-MCNC: 2.1 MG/DL (ref 1.7–2.3)
MCH RBC QN AUTO: 29.5 PG (ref 26.5–33)
MCHC RBC AUTO-ENTMCNC: 32.3 G/DL (ref 31.5–36.5)
MCV RBC AUTO: 91 FL (ref 78–100)
MONOCYTES # BLD AUTO: 0.5 10E3/UL (ref 0–1.3)
MONOCYTES NFR BLD AUTO: 9 %
NEUTROPHILS # BLD AUTO: 3.6 10E3/UL (ref 1.6–8.3)
NEUTROPHILS NFR BLD AUTO: 68 %
PLATELET # BLD AUTO: 271 10E3/UL (ref 150–450)
POTASSIUM SERPL-SCNC: 4.3 MMOL/L (ref 3.4–5.3)
PROT SERPL-MCNC: 7.2 G/DL (ref 6.4–8.3)
RBC # BLD AUTO: 4.61 10E6/UL (ref 3.8–5.2)
SODIUM SERPL-SCNC: 138 MMOL/L (ref 136–145)
TSH SERPL DL<=0.005 MIU/L-ACNC: 1.91 UIU/ML (ref 0.3–4.2)
WBC # BLD AUTO: 5.3 10E3/UL (ref 4–11)

## 2023-03-03 PROCEDURE — 83735 ASSAY OF MAGNESIUM: CPT | Performed by: FAMILY MEDICINE

## 2023-03-03 PROCEDURE — 99214 OFFICE O/P EST MOD 30 MIN: CPT | Performed by: FAMILY MEDICINE

## 2023-03-03 PROCEDURE — 80050 GENERAL HEALTH PANEL: CPT | Performed by: FAMILY MEDICINE

## 2023-03-03 PROCEDURE — 36415 COLL VENOUS BLD VENIPUNCTURE: CPT | Performed by: FAMILY MEDICINE

## 2023-03-03 ASSESSMENT — PAIN SCALES - GENERAL: PAINLEVEL: NO PAIN (0)

## 2023-03-03 NOTE — PROGRESS NOTES
"  Assessment & Plan     Palpitations  ?SVT or other. Will check additional labs as below and get her set up for an event monitor.  Further work-up and management as dictated by results.   - Comprehensive metabolic panel; Future  - CBC with Platelets & Differential; Future  - TSH with free T4 reflex; Future  - Magnesium; Future  - Adult Leadless EKG Monitor 3 to 7 Days; Future  - Comprehensive metabolic panel  - CBC with Platelets & Differential  - TSH with free T4 reflex  - Magnesium    Screen for colon cancer  - Colonoscopy Screening  Referral; Future    Visit for screening mammogram  - MA SCREENING DIGITAL BILAT - Future  (s+30); Future             MED REC REQUIRED  Post Medication Reconciliation Status:  Discharge medications reconciled, continue medications without change      No follow-ups on file.    Ashish Kelly MD  [facility]    Elvira Dee is a 47 year old, presenting for the following health issues:  ER F/U      History of Present Illness       Vascular Disease:  She presents for follow up of vascular disease.  She never takes nitroglycerin. She is not taking daily aspirin.    She eats 2-3 servings of fruits and vegetables daily.She consumes 0 sweetened beverage(s) daily.She exercises with enough effort to increase her heart rate 10 to 19 minutes per day.  She exercises with enough effort to increase her heart rate 3 or less days per week.        ED/UC Followup:    Facility:  [facility]  Date of visit: 2/28/2023  Reason for visit: cardiac event  Current Status: improved    Significant tachycardia for about 10 min      Review of Systems   Constitutional, neuro, ENT, endocrine, pulmonary, cardiac, gastrointestinal, genitourinary, musculoskeletal, integument and psychiatric systems are negative, except as otherwise noted.       Objective    /78   Pulse 74   Temp 97.7  F (36.5  C) (Tympanic)   Resp 16   Ht 1.575 m (5' 2\")   Wt 60.9 kg (134 lb " 3.2 oz)   LMP 02/26/2023   SpO2 100%   BMI 24.55 kg/m    Body mass index is 24.55 kg/m .  Physical Exam   GENERAL: Pleasant, well appearing female.  HEENT: PERRL, EOMI.  NECK: supple, no thyromegaly or thyroid masses, no lymphadenopathy.  CV: RRR, no murmurs, rubs or gallops.  LUNGS: Clear to auscultation bilaterally, normal effort.

## 2023-03-23 ENCOUNTER — HOSPITAL ENCOUNTER (OUTPATIENT)
Dept: MAMMOGRAPHY | Facility: CLINIC | Age: 48
Discharge: HOME | End: 2023-03-23
Attending: FAMILY MEDICINE | Admitting: FAMILY MEDICINE
Payer: COMMERCIAL

## 2023-03-23 DIAGNOSIS — Z12.31 VISIT FOR SCREENING MAMMOGRAM: ICD-10-CM

## 2023-03-23 PROCEDURE — 77067 SCR MAMMO BI INCL CAD: CPT

## 2023-03-30 ENCOUNTER — HOSPITAL ENCOUNTER (OUTPATIENT)
Dept: CARDIOLOGY | Facility: CLINIC | Age: 48
Discharge: HOME | End: 2023-03-30
Attending: FAMILY MEDICINE | Admitting: FAMILY MEDICINE
Payer: COMMERCIAL

## 2023-03-30 DIAGNOSIS — R00.2 PALPITATIONS: ICD-10-CM

## 2023-03-30 PROCEDURE — 93242 EXT ECG>48HR<7D RECORDING: CPT

## 2023-03-30 PROCEDURE — 93244 EXT ECG>48HR<7D REV&INTERPJ: CPT | Performed by: INTERNAL MEDICINE

## 2023-11-25 ENCOUNTER — HEALTH MAINTENANCE LETTER (OUTPATIENT)
Age: 48
End: 2023-11-25

## 2024-07-26 ENCOUNTER — NURSE TRIAGE (OUTPATIENT)
Dept: FAMILY MEDICINE | Facility: CLINIC | Age: 49
End: 2024-07-26

## 2024-07-26 NOTE — TELEPHONE ENCOUNTER
"Patient calling stating twice in the last month, and can feel that it might happen right now, she has her vision become \"pixelated\" and is unable to focus on anything. Usually lasts for around 15 minutes and gets a headache afterwards with vision returning to normal around 30 minutes later but headache continuing for a portion of the day. Has never had a migraine but does run in her family.    Per protocol should be evaluated still today, please advise on appointment or if should be seen in .    Thanks!  Mukund Sprague RN   [facility]      Reason for Disposition   Eye pain and brief (now gone) blurred vision or visual changes    Additional Information   Negative: Weakness of the face, arm or leg on one side of the body   Negative: Followed getting substance in the eye   Negative: Foreign body stuck in the eye   Negative: Followed an eye injury   Negative: Followed sun lamp or sun exposure (UV keratitis)   Negative: Yellow or green discharge (pus) in the eye   Negative: Pregnant   Negative: Complete loss of vision in one or both eyes   Negative: SEVERE eye pain   Negative: SEVERE headache   Negative: Double vision   Negative: Blurred vision or visual changes and present now and sudden onset or new (e.g., minutes, hours, days)  (Exception: Seeing floaters / black specks OR previously diagnosed migraine headaches with same symptoms.)   Negative: Patient sounds very sick or weak to the triager   Negative: Flashes of light  (Exception: Brief from pressing on the eyeball.)   Negative: Many floaters in the eye (Exception: Floater(s) are a chronic symptom and this is unchanged from patient's baseline pattern.)    Answer Assessment - Initial Assessment Questions  1. DESCRIPTION: \"How has your vision changed?\" (e.g., complete vision loss, blurred vision, double vision, floaters, etc.)      \"Pixelated vision\"  2. LOCATION: \"One or both eyes?\" If one, ask: \"Which eye?\"      Both  3. SEVERITY: \"Can you see " "anything?\" If Yes, ask: \"What can you see?\" (e.g., fine print)      During episodes can't focus on anything  4. ONSET: \"When did this begin?\" \"Did it start suddenly or has this been gradual?\"      Started around a month ago had first 15 minute episode then another around 2 weeks ago  5. PATTERN: \"Does this come and go, or has it been constant since it started?\"      Come and go  6. PAIN: \"Is there any pain in your eye(s)?\"  (Scale 1-10; or mild, moderate, severe)    - NONE (0): No pain.    - MILD (1-3): Doesn't interfere with normal activities.    - MODERATE (4-7): Interferes with normal activities or awakens from sleep.     - SEVERE (8-10): Excruciating pain, unable to do any normal activities.      No  7. CONTACTS-GLASSES: \"Do you wear contacts or glasses?\"      Wears contacts during day and glasses at night  8. CAUSE: \"What do you think is causing this visual problem?\"      Unsure  9. OTHER SYMPTOMS: \"Do you have any other symptoms?\" (e.g., confusion, headache, arm or leg weakness, speech problems)      Headache behind eyes  10. PREGNANCY: \"Is there any chance you are pregnant?\" \"When was your last menstrual period?\"        No    Protocols used: Vision Loss or Change-A-OH    "

## 2024-07-26 NOTE — TELEPHONE ENCOUNTER
Provider Recommendation Follow Up:   Reached patient/caregiver. Informed of provider's recommendations. Patient verbalized understanding and agrees with the plan.           RN discussed with Dr. Kelly.     Patient needs to be seen today due to vision changes with floaters present and co-occurring headaches.     RN called patient and informed patient of the urgency of being seen. Patient will go to [facility] today and come in Monday for a clinic follow up if needed. RN scheduled patient for an office visit with Priscilla Peters 7/29/24 and patient will cancel if needed.     Tracy Fuentes RN on 7/26/2024 at 3:17 PM

## 2024-07-29 ENCOUNTER — OFFICE VISIT (OUTPATIENT)
Dept: FAMILY MEDICINE | Facility: CLINIC | Age: 49
End: 2024-07-29
Payer: COMMERCIAL

## 2024-07-29 VITALS
RESPIRATION RATE: 16 BRPM | SYSTOLIC BLOOD PRESSURE: 118 MMHG | BODY MASS INDEX: 25.95 KG/M2 | HEART RATE: 72 BPM | OXYGEN SATURATION: 99 % | TEMPERATURE: 99 F | DIASTOLIC BLOOD PRESSURE: 60 MMHG | WEIGHT: 141 LBS | HEIGHT: 62 IN

## 2024-07-29 DIAGNOSIS — G43.109 OCULAR MIGRAINE: Primary | ICD-10-CM

## 2024-07-29 PROCEDURE — 99214 OFFICE O/P EST MOD 30 MIN: CPT | Performed by: NURSE PRACTITIONER

## 2024-07-29 RX ORDER — SUMATRIPTAN 50 MG/1
50 TABLET, FILM COATED ORAL
Qty: 9 TABLET | Refills: 0 | Status: SHIPPED | OUTPATIENT
Start: 2024-07-29

## 2024-07-29 ASSESSMENT — ENCOUNTER SYMPTOMS: HEADACHES: 1

## 2024-07-29 ASSESSMENT — PAIN SCALES - GENERAL: PAINLEVEL: NO PAIN (0)

## 2024-07-29 NOTE — PATIENT INSTRUCTIONS
See eye doctor for complete,dilated exam.  Try the Imitrex at onset of symptoms and repeat in 2 hours.  If that helps, great, if it doesn't, I recommend re imaging your brain.  Limit computer time, wear blue light blocking glasses.      Our Clinic hours are:  Mondays    7:20 am - 7 pm  Tues -  Fri  7:20 am - 5 pm    Clinic Phone: [phone]    The clinic lab opens at 7:30 am Mon - Fri and appointments are required.    [facility]  Ph. [phone]  Monday  8 am - 7pm  Tues - Fri 8 am - 5:30 pm
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
## 2024-09-24 ENCOUNTER — OFFICE VISIT (OUTPATIENT)
Dept: FAMILY MEDICINE | Facility: CLINIC | Age: 49
End: 2024-09-24
Payer: COMMERCIAL

## 2024-09-24 VITALS
HEART RATE: 75 BPM | TEMPERATURE: 98.9 F | BODY MASS INDEX: 25.81 KG/M2 | SYSTOLIC BLOOD PRESSURE: 110 MMHG | OXYGEN SATURATION: 97 % | HEIGHT: 62 IN | WEIGHT: 140.25 LBS | DIASTOLIC BLOOD PRESSURE: 70 MMHG

## 2024-09-24 DIAGNOSIS — M77.12 LATERAL EPICONDYLITIS OF BOTH ELBOWS: ICD-10-CM

## 2024-09-24 DIAGNOSIS — Z12.11 SCREEN FOR COLON CANCER: ICD-10-CM

## 2024-09-24 DIAGNOSIS — Z00.00 ROUTINE GENERAL MEDICAL EXAMINATION AT A HEALTH CARE FACILITY: Primary | ICD-10-CM

## 2024-09-24 DIAGNOSIS — M77.11 LATERAL EPICONDYLITIS OF BOTH ELBOWS: ICD-10-CM

## 2024-09-24 LAB
CHOLEST SERPL-MCNC: 156 MG/DL
FASTING STATUS PATIENT QL REPORTED: NO
HDLC SERPL-MCNC: 51 MG/DL
LDLC SERPL CALC-MCNC: 90 MG/DL
NONHDLC SERPL-MCNC: 105 MG/DL
TRIGL SERPL-MCNC: 76 MG/DL

## 2024-09-24 PROCEDURE — 99213 OFFICE O/P EST LOW 20 MIN: CPT | Mod: 25 | Performed by: FAMILY MEDICINE

## 2024-09-24 PROCEDURE — 99396 PREV VISIT EST AGE 40-64: CPT | Performed by: FAMILY MEDICINE

## 2024-09-24 PROCEDURE — 36415 COLL VENOUS BLD VENIPUNCTURE: CPT | Performed by: FAMILY MEDICINE

## 2024-09-24 PROCEDURE — 80061 LIPID PANEL: CPT | Performed by: FAMILY MEDICINE

## 2024-09-24 ASSESSMENT — PAIN SCALES - GENERAL: PAINLEVEL: NO PAIN (0)

## 2024-09-24 NOTE — PATIENT INSTRUCTIONS
Patient Education   Preventive Care Advice   This is general advice given by our system to help you stay healthy. However, your care team may have specific advice just for you. Please talk to your care team about your preventive care needs.  Nutrition  Eat 5 or more servings of fruits and vegetables each day.  Try wheat bread, brown rice and whole grain pasta (instead of white bread, rice, and pasta).  Get enough calcium and vitamin D. Check the label on foods and aim for 100% of the RDA (recommended daily allowance).  Lifestyle  Exercise at least 150 minutes each week  (30 minutes a day, 5 days a week).  Do muscle strengthening activities 2 days a week. These help control your weight and prevent disease.  No smoking.  Wear sunscreen to prevent skin cancer.  Have a dental exam and cleaning every 6 months.  Yearly exams  See your health care team every year to talk about:  Any changes in your health.  Any medicines your care team has prescribed.  Preventive care, family planning, and ways to prevent chronic diseases.  Shots (vaccines)   HPV shots (up to age 26), if you've never had them before.  Hepatitis B shots (up to age 59), if you've never had them before.  COVID-19 shot: Get this shot when it's due.  Flu shot: Get a flu shot every year.  Tetanus shot: Get a tetanus shot every 10 years.  Pneumococcal, hepatitis A, and RSV shots: Ask your care team if you need these based on your risk.  Shingles shot (for age 50 and up)  General health tests  Diabetes screening:  Starting at age 35, Get screened for diabetes at least every 3 years.  If you are younger than age 35, ask your care team if you should be screened for diabetes.  Cholesterol test: At age 39, start having a cholesterol test every 5 years, or more often if advised.  Bone density scan (DEXA): At age 50, ask your care team if you should have this scan for osteoporosis (brittle bones).  Hepatitis C: Get tested at least once in your life.  STIs (sexually  transmitted infections)  Before age 24: Ask your care team if you should be screened for STIs.  After age 24: Get screened for STIs if you're at risk. You are at risk for STIs (including HIV) if:  You are sexually active with more than one person.  You don't use condoms every time.  You or a partner was diagnosed with a sexually transmitted infection.  If you are at risk for HIV, ask about PrEP medicine to prevent HIV.  Get tested for HIV at least once in your life, whether you are at risk for HIV or not.  Cancer screening tests  Cervical cancer screening: If you have a cervix, begin getting regular cervical cancer screening tests starting at age 21.  Breast cancer scan (mammogram): If you've ever had breasts, begin having regular mammograms starting at age 40. This is a scan to check for breast cancer.  Colon cancer screening: It is important to start screening for colon cancer at age 45.  Have a colonoscopy test every 10 years (or more often if you're at risk) Or, ask your provider about stool tests like a FIT test every year or Cologuard test every 3 years.  To learn more about your testing options, visit:   .  For help making a decision, visit:   https://bit.ly/nl55364.  Prostate cancer screening test: If you have a prostate, ask your care team if a prostate cancer screening test (PSA) at age 55 is right for you.  Lung cancer screening: If you are a current or former smoker ages 50 to 80, ask your care team if ongoing lung cancer screenings are right for you.  For informational purposes only. Not to replace the advice of your health care provider. Copyright   2023 [employer]. All rights reserved. Clinically reviewed by the [facility] Transitions Program. [employer] 011201 - REV 01/24.

## 2024-09-24 NOTE — PROGRESS NOTES
"Preventive Care Visit  [facility]  Lili Tyson MD, Family Medicine  Sep 24, 2024      Assessment & Plan     Routine general medical examination at a health care facility     - Lipid panel reflex to direct LDL Non-fasting; Future  - Lipid panel reflex to direct LDL Non-fasting    Screen for colon cancer     - Colonoscopy Screening  Referral; Future    Lateral epicondylitis of both elbows  Handout given  Discussed condition and next steps if conservative measures, NSAIDS, stretching/exercise do not improve symptoms (hand/OT therapy, injections, etc)    Patient has been advised of split billing requirements and indicates understanding: Yes        BMI  Estimated body mass index is 25.65 kg/m  as calculated from the following:    Height as of this encounter: 1.575 m (5' 2\").    Weight as of this encounter: 63.6 kg (140 lb 4 oz).       Counseling  Appropriate preventive services were addressed with this patient via screening, questionnaire, or discussion as appropriate for fall prevention, nutrition, physical activity, Tobacco-use cessation, social engagement, weight loss and cognition.  Checklist reviewing preventive services available has been given to the patient.  Reviewed patient's diet, addressing concerns and/or questions.   She is at risk for lack of exercise and has been provided with information to increase physical activity for the benefit of her well-being.           Elvira Dee is a 48 year old, presenting for the following:  Physical        9/24/2024     7:48 AM   Additional Questions   Roomed by Santa daniel CMA   Accompanied by Self        Health Care Directive  Patient does not have a Health Care Directive or Living Will: Discussed advance care planning with patient; however, patient declined at this time.    HPI    - discuss possible Lyme's    A month ago woke up with both elbows aching.  Started with no preceding activities/injury.  No illness  Hurts to lift " things  Feels weaker because of this    Maternal grandmother with arthritis ?RA  Mom has arthritis in a few joints  Sister just diagnosed with hip arthritis and COLEEN at age 51    Had ocular migraines.  Eyes wouldn't focus.  Had this happen at work. +nausea  Did get a headache as the scotomata resolved.           9/19/2024   General Health   How would you rate your overall physical health? Good   Feel stress (tense, anxious, or unable to sleep) Not at all            9/19/2024   Nutrition   Three or more servings of calcium each day? Yes   Diet: Regular (no restrictions)   How many servings of fruit and vegetables per day? (!) 2-3   How many sweetened beverages each day? 0-1            9/19/2024   Exercise   Days per week of moderate/strenous exercise 3 days   Average minutes spent exercising at this level 20 min            9/19/2024   Social Factors   Frequency of gathering with friends or relatives More than three times a week   Worry food won't last until get money to buy more No   Food not last or not have enough money for food? No   Do you have housing? (Housing is defined as stable permanent housing and does not include staying ouside in a car, in a tent, in an abandoned building, in an overnight shelter, or couch-surfing.) Yes   Are you worried about losing your housing? No   Lack of transportation? No   Unable to get utilities (heat,electricity)? No            9/19/2024   Dental   Dentist two times every year? Yes            9/19/2024   TB Screening   Were you born outside of the [place]? No            Today's PHQ-2 Score:       9/24/2024     7:38 AM   PHQ-2 ( 1999 [employer])   Q1: Little interest or pleasure in doing things 0   Q2: Feeling down, depressed or hopeless 0   PHQ-2 Score 0   Q1: Little interest or pleasure in doing things Not at all   Q2: Feeling down, depressed or hopeless Not at all   PHQ-2 Score 0           9/19/2024   Substance Use   Alcohol more than 3/day or more than 7/wk No   Do you use any other  "substances recreationally? No        Social History     Tobacco Use    Smoking status: Never    Smokeless tobacco: Never   Vaping Use    Vaping status: Never Used   Substance Use Topics    Alcohol use: Yes     Comment: rarely    Drug use: No           10/18/2021   LAST FHS-7 RESULTS   1st degree relative breast or ovarian cancer No   Any relative bilateral breast cancer No   Any male have breast cancer No   Any ONE woman have BOTH breast AND ovarian cancer No   Any woman with breast cancer before 50yrs No   2 or more relatives with breast AND/OR ovarian cancer No   2 or more relatives with breast AND/OR bowel cancer No           Mammogram Screening - Mammogram every 1-2 years updated in Health Maintenance based on mutual decision making        9/19/2024   STI Screening   New sexual partner(s) since last STI/HIV test? No        History of abnormal Pap smear: No - age 30-64 HPV with reflex Pap every 5 years recommended        Latest Ref Rng & Units 9/30/2021    11:16 AM 6/1/2017    11:23 AM 6/1/2017    10:45 AM   PAP / HPV   PAP  Negative for Intraepithelial Lesion or Malignancy (NILM)      PAP (Historical)   NIL     HPV 16 DNA Negative Negative   Negative    HPV 18 DNA Negative Negative   Negative    Other HR HPV Negative Negative   Negative      ASCVD Risk   The ASCVD Risk score (Rosio REID, et al., 2019) failed to calculate for the following reasons:    Cannot find a previous HDL lab    Cannot find a previous total cholesterol lab        9/19/2024   Contraception/Family Planning   Questions about contraception or family planning No           Reviewed and updated as needed this visit by Provider                          Review of Systems  Constitutional, HEENT, cardiovascular, pulmonary, gi and gu systems are negative, except as otherwise noted.     Objective    Exam  /70   Pulse 75   Temp 98.9  F (37.2  C) (Tympanic)   Ht 1.575 m (5' 2\")   Wt 63.6 kg (140 lb 4 oz)   LMP 08/30/2024 (Approximate)  " " SpO2 97%   BMI 25.65 kg/m     Estimated body mass index is 25.65 kg/m  as calculated from the following:    Height as of this encounter: 1.575 m (5' 2\").    Weight as of this encounter: 63.6 kg (140 lb 4 oz).    Physical Exam  GENERAL: alert and no distress  NECK: no adenopathy, no asymmetry, masses, or scars  RESP: lungs clear to auscultation - no rales, rhonchi or wheezes  CV: regular rate and rhythm, normal S1 S2, no S3 or S4, no murmur, click or rub, no peripheral edema  ABDOMEN: soft, nontender, no hepatosplenomegaly, no masses and bowel sounds normal  MS: EXTREMITIES: bilateral elbow is tender over lateral epicondyle. Resisted external rotation and extension of wrist and fingers reproduces pain.            Signed Electronically by: Lili Tyson MD    "

## 2024-09-25 NOTE — RESULT ENCOUNTER NOTE
Leila,    These labs are normal or acceptable.    Please contact my office if you have questions.    Lili Tyson M.D.

## 2024-10-21 ENCOUNTER — TELEPHONE (OUTPATIENT)
Dept: GASTROENTEROLOGY | Facility: CLINIC | Age: 49
End: 2024-10-21
Payer: COMMERCIAL

## 2024-10-21 NOTE — TELEPHONE ENCOUNTER
"Endoscopy Scheduling Screen      What insurance is in the chart?  Other:  [employer]    Ordering/Referring Provider: Lili Tyson MD      (If ordering provider performs procedure, schedule with ordering provider unless otherwise instructed. )    BMI: There is no height or weight on file to calculate BMI.     Sedation Ordered  general anesthesia.   BMI<= 45 45 < BMI <= 48 48 < BMI < = 50  BMI > 50   No Restrictions No [facility]  No [facility]  [facility] with exceptions Hospital Only OR Only       Do you have a history of malignant hyperthermia?  No    (Females) Are you currently pregnant?   No     Have you been diagnosed or told you have pulmonary hypertension?   No    Do you have any heart conditions?  Yes   Irregular heart beat  In the past year, have you had any hospitalizations for heart related issues including cardiomyopathy, heart attack, or stent placement?  No    Do you have any implantable devices in your body (pacemaker, ICD)?  No    Do you take nitroglycerine?  No    Do you have an LVAD?  No    Have you been told you have moderate to severe sleep apnea?  No.    Have you been told you have COPD, asthma, or any other lung disease?  No    Have you ever had or are you waiting for an organ transplant?  No. Continue scheduling, no site restrictions.    Have you had a stroke or transient ischemic attack (TIA aka \"mini stroke\" in the last 6 months?   No    Have you been diagnosed with or been told you have cirrhosis of the liver?   No.    Are you currently on dialysis?   No    Do you need assistance transferring?   No    BMI: There is no height or weight on file to calculate BMI.     Is patients BMI > 50?  No    BMI > 40?  No    Do you have a diagnosis of diabetes?  No    Do you take an injectable medication for weight loss or diabetes (excluding insulin)?  No    Do you take the medication Naltrexone?  No    Do you take blood thinners?  No     Prep   Are you currently on dialysis or do you have chronic kidney " disease?  No    Do you have a diagnosis of cystic fibrosis (CF)?  No    On a regular basis do you go 3 -5 days between bowel movements?  No    Preferred Pharmacy:  [facility]    Final Scheduling Details     Procedure scheduled  Colonoscopy    Surgeon:  Sarbjit      Date of procedure:  12/12/24     Location  [place] - Per order.    What is your communication preference for Instructions and/or Bowel Prep?   [employer]    Patient Reminders:    You will receive a call from a Nurse to review instructions and health history.  This assessment must be completed prior to your procedure.  Failure to complete the Nurse assessment may result in the procedure being cancelled.       On the day of your procedure, please designate an adult(s) who can drive you home stay with you for the next 24 hours. The medicines used in the exam will make you sleepy. You will not be able to drive.       You cannot take public transportation, ride share services, or non-medical taxi service without a responsible caregiver.  Medical transport services are allowed with the requirement that a responsible caregiver will receive you at your destination.  We require that drivers and caregivers are confirmed prior to your procedure.

## 2024-12-11 ENCOUNTER — ANESTHESIA EVENT (OUTPATIENT)
Dept: GASTROENTEROLOGY | Facility: CLINIC | Age: 49
End: 2024-12-11
Payer: COMMERCIAL

## 2024-12-11 NOTE — ANESTHESIA PREPROCEDURE EVALUATION
Anesthesia Pre-Procedure Evaluation    Patient: Leila Azul   MRN: [id number] : 1975        Procedure : Procedure(s):  Colonoscopy          Past Medical History:   Diagnosis Date    Anemia      increased blood loss after 1st delivery    Chickenpox      labor ,,    -bedrest/terbutaline wiith 1st two preg.      Past Surgical History:   Procedure Laterality Date    SURGICAL HISTORY OF -       eye surgery age 2      Allergies   Allergen Reactions    Amoxicillin Rash    Trimethoprim Sulfate Other (See Comments)     Vomiting blood after taking medication.    Doxycycline Rash      Social History     Tobacco Use    Smoking status: Never    Smokeless tobacco: Never   Substance Use Topics    Alcohol use: Yes     Comment: rarely      Wt Readings from Last 1 Encounters:   24 63.6 kg (140 lb 4 oz)        Anesthesia Evaluation            ROS/MED HX  ENT/Pulmonary:       Neurologic:       Cardiovascular:       METS/Exercise Tolerance:     Hematologic:     (+)      anemia,          Musculoskeletal:       GI/Hepatic:       Renal/Genitourinary:       Endo:       Psychiatric/Substance Use:       Infectious Disease:       Malignancy:       Other:          Physical Exam    Airway  airway exam normal           Respiratory Devices and Support         Dental       (+) Modest Abnormalities - crowns, retainers, 1 or 2 missing teeth      Cardiovascular   cardiovascular exam normal          Pulmonary   pulmonary exam normal                OUTSIDE LABS:  CBC:   Lab Results   Component Value Date    WBC 5.3 2023    WBC 6.7 2021    HGB 13.6 2023    HGB 13.3 2021    HCT 42.1 2023    HCT 41.1 2021     2023     2021     BMP:   Lab Results   Component Value Date     2023     2021    POTASSIUM 4.3 2023    POTASSIUM 4.4 2021    CHLORIDE 103 2023    CHLORIDE 107 2021    CO2 25 2023    CO2 29  "09/03/2021    BUN 12.7 03/03/2023    BUN 10 09/03/2021    CR 0.84 03/03/2023    CR 0.78 09/03/2021    GLC 84 03/03/2023    GLC 71 09/03/2021     COAGS: No results found for: \"PTT\", \"INR\", \"FIBR\"  POC:   Lab Results   Component Value Date    HCG Negative 03/15/2019     HEPATIC:   Lab Results   Component Value Date    ALBUMIN 4.5 03/03/2023    PROTTOTAL 7.2 03/03/2023    ALT 19 03/03/2023    AST 25 03/03/2023    ALKPHOS 60 03/03/2023    BILITOTAL 0.4 03/03/2023     OTHER:   Lab Results   Component Value Date    LACT 0.7 02/07/2017    MAX 9.4 03/03/2023    MAG 2.1 03/03/2023    LIPASE 229 03/18/2019    TSH 1.91 03/03/2023    T4 1.2 03/07/2013    CRP 6.3 12/15/2010    SED 4 12/15/2010       Anesthesia Plan    ASA Status:  3    NPO Status:  NPO Appropriate    Anesthesia Type: General.      Maintenance: Balanced.        Consents    Anesthesia Plan(s) and associated risks, benefits, and realistic alternatives discussed. Questions answered and patient/representative(s) expressed understanding.     - Discussed:     - Discussed with:  Patient            Postoperative Care            Comments:               CATHERINE Barlow CRNA    I have reviewed the pertinent notes and labs in the chart from the past 30 days and (re)examined the patient.  Any updates or changes from those notes are reflected in this note.                                   "

## 2024-12-12 ENCOUNTER — ANESTHESIA (OUTPATIENT)
Dept: GASTROENTEROLOGY | Facility: CLINIC | Age: 49
End: 2024-12-12
Payer: COMMERCIAL

## 2025-02-24 ENCOUNTER — HOSPITAL ENCOUNTER (OUTPATIENT)
Facility: CLINIC | Age: 50
Discharge: HOME | End: 2025-02-24
Attending: SURGERY | Admitting: SURGERY
Payer: COMMERCIAL

## 2025-02-24 VITALS
DIASTOLIC BLOOD PRESSURE: 69 MMHG | WEIGHT: 140 LBS | BODY MASS INDEX: 25.76 KG/M2 | RESPIRATION RATE: 16 BRPM | HEIGHT: 62 IN | TEMPERATURE: 98.8 F | SYSTOLIC BLOOD PRESSURE: 94 MMHG | HEART RATE: 80 BPM | OXYGEN SATURATION: 100 %

## 2025-02-24 LAB — COLONOSCOPY: NORMAL

## 2025-02-24 PROCEDURE — 45378 DIAGNOSTIC COLONOSCOPY: CPT | Performed by: SURGERY

## 2025-02-24 PROCEDURE — 258N000003 HC RX IP 258 OP 636: Performed by: NURSE ANESTHETIST, CERTIFIED REGISTERED

## 2025-02-24 PROCEDURE — G0121 COLON CA SCRN NOT HI RSK IND: HCPCS | Performed by: SURGERY

## 2025-02-24 PROCEDURE — 250N000009 HC RX 250: Performed by: PHYSICIAN ASSISTANT

## 2025-02-24 PROCEDURE — 370N000017 HC ANESTHESIA TECHNICAL FEE, PER MIN: Performed by: SURGERY

## 2025-02-24 RX ORDER — NALOXONE HYDROCHLORIDE 0.4 MG/ML
0.1 INJECTION, SOLUTION INTRAMUSCULAR; INTRAVENOUS; SUBCUTANEOUS
Status: DISCONTINUED | OUTPATIENT
Start: 2025-02-24 | End: 2025-02-24 | Stop reason: HOSPADM

## 2025-02-24 RX ORDER — LIDOCAINE HYDROCHLORIDE 20 MG/ML
INJECTION, SOLUTION INFILTRATION; PERINEURAL PRN
Status: DISCONTINUED | OUTPATIENT
Start: 2025-02-24 | End: 2025-02-24

## 2025-02-24 RX ORDER — PROPOFOL 10 MG/ML
INJECTION, EMULSION INTRAVENOUS CONTINUOUS PRN
Status: DISCONTINUED | OUTPATIENT
Start: 2025-02-24 | End: 2025-02-24

## 2025-02-24 RX ORDER — LIDOCAINE 40 MG/G
CREAM TOPICAL
Status: DISCONTINUED | OUTPATIENT
Start: 2025-02-24 | End: 2025-02-24 | Stop reason: HOSPADM

## 2025-02-24 RX ORDER — SODIUM CHLORIDE, SODIUM LACTATE, POTASSIUM CHLORIDE, CALCIUM CHLORIDE 600; 310; 30; 20 MG/100ML; MG/100ML; MG/100ML; MG/100ML
INJECTION, SOLUTION INTRAVENOUS CONTINUOUS
Status: DISCONTINUED | OUTPATIENT
Start: 2025-02-24 | End: 2025-02-24 | Stop reason: HOSPADM

## 2025-02-24 RX ORDER — GLYCOPYRROLATE 0.2 MG/ML
INJECTION, SOLUTION INTRAMUSCULAR; INTRAVENOUS PRN
Status: DISCONTINUED | OUTPATIENT
Start: 2025-02-24 | End: 2025-02-24

## 2025-02-24 RX ORDER — DEXAMETHASONE SODIUM PHOSPHATE 4 MG/ML
4 INJECTION, SOLUTION INTRA-ARTICULAR; INTRALESIONAL; INTRAMUSCULAR; INTRAVENOUS; SOFT TISSUE
Status: DISCONTINUED | OUTPATIENT
Start: 2025-02-24 | End: 2025-02-24 | Stop reason: HOSPADM

## 2025-02-24 RX ADMIN — LIDOCAINE HYDROCHLORIDE 0.2 ML: 10 INJECTION, SOLUTION EPIDURAL; INFILTRATION; INTRACAUDAL; PERINEURAL at 12:32

## 2025-02-24 RX ADMIN — LIDOCAINE HYDROCHLORIDE 100 MG: 20 INJECTION, SOLUTION INFILTRATION; PERINEURAL at 13:09

## 2025-02-24 RX ADMIN — SODIUM CHLORIDE, POTASSIUM CHLORIDE, SODIUM LACTATE AND CALCIUM CHLORIDE: 600; 310; 30; 20 INJECTION, SOLUTION INTRAVENOUS at 12:31

## 2025-02-24 RX ADMIN — GLYCOPYRROLATE 0.2 MG: 0.2 INJECTION, SOLUTION INTRAMUSCULAR; INTRAVENOUS at 13:10

## 2025-02-24 RX ADMIN — PROPOFOL 200 MCG/KG/MIN: 10 INJECTION, EMULSION INTRAVENOUS at 13:09

## 2025-02-24 ASSESSMENT — ACTIVITIES OF DAILY LIVING (ADL)
ADLS_ACUITY_SCORE: 41
ADLS_ACUITY_SCORE: 41

## 2025-02-24 NOTE — ANESTHESIA POSTPROCEDURE EVALUATION
Patient: Leila Azul    Procedure: Procedure(s):  Colonoscopy       Anesthesia Type:  General    Note:  Disposition: Outpatient   Postop Pain Control: Uneventful            Sign Out: Well controlled pain   PONV: No   Neuro/Psych: Uneventful            Sign Out: Acceptable/Baseline neuro status   Airway/Respiratory: Uneventful            Sign Out: Acceptable/Baseline resp. status   CV/Hemodynamics: Uneventful            Sign Out: Acceptable CV status; No obvious hypovolemia; No obvious fluid overload   Other NRE: NONE   DID A NON-ROUTINE EVENT OCCUR? No           Last vitals:  Vitals:    02/24/25 1202   BP: 101/76   Pulse: 80   Resp: 16   Temp: 37.1  C (98.8  F)   SpO2: 98%       Electronically Signed By: CATHERINE Duckworth CRNA  February 24, 2025  1:31 PM

## 2025-02-24 NOTE — ANESTHESIA CARE TRANSFER NOTE
Patient: Leila Azul    Procedure: Procedure(s):  Colonoscopy       Diagnosis: Screen for colon cancer [Z12.11]  Diagnosis Additional Information: No value filed.    Anesthesia Type:   General     Note:    Oropharynx: oropharynx clear of all foreign objects and spontaneously breathing  Level of Consciousness: awake and drowsy  Oxygen Supplementation: room air    Independent Airway: airway patency satisfactory and stable  Dentition: dentition unchanged  Vital Signs Stable: post-procedure vital signs reviewed and stable  Report to RN Given: handoff report given  Patient transferred to: Phase II    Handoff Report: Identifed the Patient, Identified the Reponsible Provider, Reviewed the pertinent medical history, Discussed the surgical course, Reviewed Intra-OP anesthesia mangement and issues during anesthesia, Set expectations for post-procedure period and Allowed opportunity for questions and acknowledgement of understanding      Vitals:  Vitals Value Taken Time   BP     Temp     Pulse     Resp     SpO2         Electronically Signed By: CATHERINE Duckworth CRNA  February 24, 2025  1:31 PM

## 2025-02-24 NOTE — H&P
formerly [facility]    Pre-Endoscopy History and Physical     Leila Azul MRN# [id number]   YOB: 1975 Age: 49 year old     Date of Procedure: 2025  Primary care provider: Lili Tyson  Type of Endoscopy: Colonoscopy with possible biopsy, possible polypectomy  Reason for Procedure: screening  Type of Anesthesia Anticipated: MAC    HPI:    Leila is a 49 year old female who will be undergoing the above procedure.  Colon 1st screening ; no famhx of colon ca; no blood thinner     A history and physical has been performed. The patient's medications and allergies have been reviewed. The risks and benefits of the procedure and the sedation options and risks were discussed with the patient.  All questions were answered and informed consent was obtained.      She denies a personal or family history of anesthesia complications or bleeding disorders.     Patient Active Problem List   Diagnosis    Hair loss    CARDIOVASCULAR SCREENING; LDL GOAL LESS THAN 160    Alopecia    Benign neoplasm of skin of lower limb, including hip    Chest pain, unspecified    Cystocele, midline    Other tenosynovitis of hand and wrist    Tietze's disease        Past Medical History:   Diagnosis Date    Anemia      increased blood loss after 1st delivery    Chickenpox      labor ,,    -bedrest/terbutaline wiith 1st two preg.        Past Surgical History:   Procedure Laterality Date    SURGICAL HISTORY OF -       eye surgery age 2       Social History     Tobacco Use    Smoking status: Never    Smokeless tobacco: Never   Substance Use Topics    Alcohol use: Yes     Comment: rarely       Family History   Problem Relation Age of Onset    Circulatory Mother         DVT OF THE LEG    Cerebrovascular Disease Maternal Grandfather     Cancer Paternal Grandmother         age 55, Breast Ca    Depression Paternal Grandmother     Gastrointestinal Disease Father         ulcerative colitis       Prior  "to Admission medications    Medication Sig Start Date End Date Taking? Authorizing Provider   Multiple Vitamins-Minerals (DAILY MULTI PO) Take by mouth.   Yes Reported, Patient       Allergies   Allergen Reactions    Amoxicillin Rash    Trimethoprim Sulfate Other (See Comments)     Vomiting blood after taking medication.    Doxycycline Rash        REVIEW OF SYSTEMS:   5 point ROS negative except as noted above in HPI, including Gen., Resp., CV, GI &  system review.    PHYSICAL EXAM:   /76 (BP Location: Left arm)   Pulse 80   Temp 98.8  F (37.1  C) (Oral)   Resp 16   Ht 1.575 m (5' 2\")   Wt 63.5 kg (140 lb)   SpO2 98%   BMI 25.61 kg/m   Estimated body mass index is 25.61 kg/m  as calculated from the following:    Height as of this encounter: 1.575 m (5' 2\").    Weight as of this encounter: 63.5 kg (140 lb).   Constitutional: Awake, alert, no acute distress.  Eyes: No scleral icterus.  Conjunctiva are without injection.  ENMT: Mucous membranes moist, dentition and gums are intact.   Neck: Soft, supple, trachea midline.    Endocrine: n/a   Lymphatic: There is no cervical, submandibularadenopathy.  Respiratory: normal effortgs   Cardiovascular: S1, S2  Abdomen: Non-distended, non-tender,  No masses,  Musculoskeletal: No spinal or CVA tenderness. Full range of motion in the upper and lower extremities.    Skin: No skin rashes or lesions to inspection.  No petechia.    Neurologic: alerted and oriented 3x  Psychiatric: The patient's affect is not blunted and mood is appropriate.  DIAGNOSTICS:    Not indicated    IMPRESSION   ASA Class 2 - Mild systemic disease    PLAN:   Plan for Colonoscopy with possible biopsy, possible polypectomy. We discussed the risks, benefits and alternatives and the patient wished to proceed.  Patient is cleared for the above procedure.    The above has been forwarded to the consulting provider.    [facility], DO  [facility]       "

## 2025-05-31 ENCOUNTER — HEALTH MAINTENANCE LETTER (OUTPATIENT)
Age: 50
End: 2025-05-31

## (undated) RX ORDER — LIDOCAINE HYDROCHLORIDE 10 MG/ML
INJECTION, SOLUTION EPIDURAL; INFILTRATION; INTRACAUDAL; PERINEURAL
Status: DISPENSED
Start: 2025-02-24